# Patient Record
Sex: FEMALE | Employment: UNEMPLOYED | URBAN - METROPOLITAN AREA
[De-identification: names, ages, dates, MRNs, and addresses within clinical notes are randomized per-mention and may not be internally consistent; named-entity substitution may affect disease eponyms.]

---

## 2021-06-10 ENCOUNTER — HOSPITAL ENCOUNTER (OUTPATIENT)
Age: 36
Setting detail: SPECIMEN
Discharge: HOME | End: 2021-06-10
Payer: COMMERCIAL

## 2021-06-10 ENCOUNTER — OFFICE VISIT (OUTPATIENT)
Dept: PODIATRY | Age: 36
End: 2021-06-10
Payer: COMMERCIAL

## 2021-06-10 VITALS — HEIGHT: 64 IN | WEIGHT: 204 LBS | BODY MASS INDEX: 34.83 KG/M2

## 2021-06-10 DIAGNOSIS — B35.1 DERMATOPHYTOSIS OF NAIL: Primary | ICD-10-CM

## 2021-06-10 PROCEDURE — 11755 BIOPSY NAIL UNIT: CPT | Performed by: PODIATRIST

## 2021-06-10 PROCEDURE — 99203 OFFICE O/P NEW LOW 30 MIN: CPT | Performed by: PODIATRIST

## 2021-06-10 RX ORDER — PANTOPRAZOLE SODIUM 40 MG/1
40 TABLET, DELAYED RELEASE ORAL DAILY
COMMUNITY
Start: 2020-11-17

## 2021-06-10 RX ORDER — ACETAMINOPHEN 500 MG
500 TABLET ORAL EVERY 6 HOURS PRN
COMMUNITY

## 2021-06-10 RX ORDER — IBUPROFEN 200 MG
200 TABLET ORAL PRN
COMMUNITY

## 2021-06-10 RX ORDER — LORATADINE 10 MG/1
20 TABLET ORAL DAILY
COMMUNITY

## 2021-06-10 RX ORDER — DIPHENHYDRAMINE HCL 25 MG
25 CAPSULE ORAL NIGHTLY
COMMUNITY

## 2021-06-10 NOTE — PROGRESS NOTES
30 [facility] [id number] [address]  Dept: [phone]    NEW PATIENT PROGRESS NOTE  Date of patient's visit: 6/10/2021  Patient's Name:  Thad Hickey YOB: 1985            Patient Care Team:  Sheyla Santo DO as PCP - General (Family Medicine)        Chief Complaint   Patient presents with    New Patient    Nail Problem         HPI:   Thad Hickey is a 29 y.o. female who presents to the office today complaining of possible toenail fungus. Symptoms began 6 month(s) ago. Patient relates pain is Absent . Pain is rated 0 out of 10 and is described as none. Treatments prior to today's visit include: none. Currently denies F/C/N/V. Pt's primary care physician is Sheyla Santo DO last seen patient is new to the provider and has an upcoming appointment as a new patient. No Known Allergies    Past Medical History:   Diagnosis Date    Asthma 1998    Migraines     W/ MENSES       Prior to Admission medications    Medication Sig Start Date End Date Taking? Authorizing Provider   cyclobenzaprine (FLEXERIL) 5 MG tablet Take 5 mg by mouth 3 times daily as needed. Historical Provider, MD   Norgestim-Eth Estrad Triphasic (TRINESSA, 28,) 0.18/0.215/0.25 MG-35 MCG TABS Take 1 tablet by mouth daily. Dispense as written 11/8/12   LUIS FERNANDO Moreno CNM       Past Surgical History:   Procedure Laterality Date    MANDIBLE FRACTURE SURGERY  2002    TONSILLECTOMY  1998       No family history on file. Social History     Tobacco Use    Smoking status: Never Smoker    Smokeless tobacco: Never Used   Substance Use Topics    Alcohol use: No       Review of Systems    Review of Systems:   History obtained from chart review and the patient  General ROS: negative for - chills, fatigue, fever, night sweats or weight gain  Constitutional: Negative for chills, diaphoresis, fatigue, fever and unexpected weight change. Musculoskeletal: Positive for arthralgias, gait problem and joint swelling. Neurological ROS: negative for - behavioral changes, confusion, headaches or seizures. Negative for weakness and numbness. Dermatological ROS: negative for - mole changes, rash  Cardiovascular: Negative for leg swelling. Gastrointestinal: Negative for constipation, diarrhea, nausea and vomiting. Lower Extremity Physical Examination:   Vitals: There were no vitals filed for this visit. General: AAO x 3 in NAD. Dermatologic Exam:  Left hallux nail is thickened dystrophic with slight discoloration. Musculoskeletal:     1st MPJ ROM decreased, Bilateral.  Muscle strength 5/5, Bilateral.  Medial longitudinal arch, Bilateral WNL. Ankle ROM WNL,Bilateral.    Dorsally contracted digits absent digits 1-5 Bilateral.     Vascular: DP and PT pulses palpable 2/4, Bilateral.  CFT <3 seconds, Bilateral.  Hair growth present to the level of the digits, Bilateral.  Edema absent, Bilateral.  Varicosities absent, Bilateral. Erythema absent, Bilateral    Neurological: Sensation intact to light touch to level of digits, Bilateral.  Protective sensation intact 10/10 sites via 5.07/10g [place]-Bong Monofilament, Bilateral.  negative Tinel's, Bilateral.  negative Valleix sign, Bilateral.      Integument: Warm, dry, supple, Bilateral.  Open lesion absent, Bilateral.  Interdigital maceration absent to web spaces 1-4, Bilateral.   Fissures absent, Bilateral.     Asessment: Patient is a 29 y.o. female with:    Diagnosis Orders   1. Dermatophytosis of nail  Hepatic Function Panel    HI BIOPSY, NAIL UNIT (SEP PROC)         Plan: Patient examined and evaluated. Current condition and treatment options discussed in detail. Discussed conservative and surgical options with the patient. Advised pt to get LFT. Nail biopsy obtained of left hallux nail and sent to pathology.  Discussed topical vs oral antifungal. She would like to proceed with oral medication. Pending biopsy results, will call in medication to pharmacy. All labs were reviewed and all imagining including the above findings were reviewed PRIOR to the patients arrival and with the patient today. Previous patient encounter was reviewed. Encounters from the patients other medical providers were reviewed and noted. Time was spent educating the patient and their families/caregivers on proper care of the feet and ankles. All the above diagnosis were addressed at todays visit and all questions were answered. A total of 30 minutes was spent with this patients encounter which included charting after the patients visit    . Verbal and written instructions given to patient. Contact office with any questions/problems/concerns. RTC in 9week(s).     6/10/2021    Electronically signed by Antonia Torres DPM on 6/10/2021 at 9:14 AM  6/10/2021

## 2021-06-14 LAB — DERMATOLOGY PATHOLOGY REPORT: NORMAL

## 2023-08-01 ENCOUNTER — OFFICE VISIT (OUTPATIENT)
Dept: PODIATRY | Age: 38
End: 2023-08-01
Payer: COMMERCIAL

## 2023-08-01 VITALS — HEIGHT: 65 IN | WEIGHT: 240 LBS | BODY MASS INDEX: 39.99 KG/M2

## 2023-08-01 DIAGNOSIS — S92.345A CLOSED NONDISPLACED FRACTURE OF FOURTH METATARSAL BONE OF LEFT FOOT, INITIAL ENCOUNTER: ICD-10-CM

## 2023-08-01 DIAGNOSIS — M79.605 PAIN OF LEFT LOWER EXTREMITY: ICD-10-CM

## 2023-08-01 DIAGNOSIS — S92.335A CLOSED NONDISPLACED FRACTURE OF THIRD METATARSAL BONE OF LEFT FOOT, INITIAL ENCOUNTER: Primary | ICD-10-CM

## 2023-08-01 DIAGNOSIS — R60.0 EDEMA OF LOWER EXTREMITY: ICD-10-CM

## 2023-08-01 PROCEDURE — L4361 PNEUMA/VAC WALK BOOT PRE OTS: HCPCS | Performed by: PODIATRIST

## 2023-08-01 PROCEDURE — 99214 OFFICE O/P EST MOD 30 MIN: CPT | Performed by: PODIATRIST

## 2023-08-01 PROCEDURE — 28470 CLTX METATARSAL FX WO MNP EA: CPT | Performed by: PODIATRIST

## 2023-08-01 RX ORDER — IBUPROFEN 600 MG/1
600 TABLET ORAL 3 TIMES DAILY PRN
Qty: 30 TABLET | Refills: 0 | Status: SHIPPED | OUTPATIENT
Start: 2023-08-01

## 2023-08-01 RX ORDER — HYDROCODONE BITARTRATE AND ACETAMINOPHEN 5; 325 MG/1; MG/1
1 TABLET ORAL EVERY 6 HOURS PRN
Qty: 28 TABLET | Refills: 0 | Status: SHIPPED | OUTPATIENT
Start: 2023-08-01 | End: 2023-08-08

## 2023-08-01 RX ORDER — ESCITALOPRAM OXALATE 10 MG/1
10 TABLET ORAL DAILY
COMMUNITY

## 2023-08-01 NOTE — PROGRESS NOTES
[address]  Dept: [phone]    RETURN PATIENT PROGRESS NOTE  Date of patient's visit: 8/1/2023  Patient's Name:  Shereen Kelley YOB: 1985            Patient Care Team:  Josefina Hutchinson DO as PCP - General (Family Medicine)       Shereen Kelley 41 y.o. female that presents for follow-up of   Chief Complaint   Patient presents with    Foot Injury     Left foot x 2 days    Foot Pain     Left foot     Pt's primary care physician is Josefina Hutchinson DO last seen February 27 2023  Symptoms began 2 day(s) ago and are increased . Patient relates pain is Present. Pain is rated 8 out of 10 and is described as constant. Treatments prior to today's visit include: visit to emergency room, xrays, crutches, surgical shoe, norco for pain alternating with ibuprofen. Currently denies F/C/N/V. Patient states she slipped in a mud puddle. Allergies   Allergen Reactions    Seasonal      Other reaction(s): Other (See Comments)  Dust mites, and environmental; sneezing, watery eyes       Past Medical History:   Diagnosis Date    Asthma 1998    Migraines     W/ MENSES       Prior to Admission medications    Medication Sig Start Date End Date Taking? Authorizing Provider   escitalopram (LEXAPRO) 10 MG tablet Take 1 tablet by mouth daily   Yes Historical Provider, MD   ciclopirox (PENLAC) 8 % solution Apply topically nightly. Remove once weekly with alcohol or nail polish remover.  6/15/21  Yes Doy Sensing, DPM   pantoprazole (PROTONIX) 40 MG tablet Take 1 tablet by mouth daily 11/17/20  Yes Historical Provider, MD   loratadine (CLARITIN) 10 MG tablet Take 2 tablets by mouth daily   Yes Historical Provider, MD   ibuprofen (ADVIL;MOTRIN) 200 MG tablet Take 1 tablet by mouth as needed   Yes Historical Provider, MD   diphenhydrAMINE (BENADRYL) 25 MG capsule Take 1 capsule by mouth nightly

## 2023-08-22 ENCOUNTER — OFFICE VISIT (OUTPATIENT)
Dept: PODIATRY | Age: 38
End: 2023-08-22
Payer: COMMERCIAL

## 2023-08-22 VITALS — BODY MASS INDEX: 39.99 KG/M2 | WEIGHT: 240 LBS | HEIGHT: 65 IN

## 2023-08-22 DIAGNOSIS — S92.345A CLOSED NONDISPLACED FRACTURE OF FOURTH METATARSAL BONE OF LEFT FOOT, INITIAL ENCOUNTER: ICD-10-CM

## 2023-08-22 DIAGNOSIS — M79.605 PAIN OF LEFT LOWER EXTREMITY: ICD-10-CM

## 2023-08-22 DIAGNOSIS — R60.0 EDEMA OF LOWER EXTREMITY: ICD-10-CM

## 2023-08-22 DIAGNOSIS — S92.335A CLOSED NONDISPLACED FRACTURE OF THIRD METATARSAL BONE OF LEFT FOOT, INITIAL ENCOUNTER: Primary | ICD-10-CM

## 2023-08-22 PROCEDURE — 99213 OFFICE O/P EST LOW 20 MIN: CPT | Performed by: PODIATRIST

## 2023-08-22 RX ORDER — HYDROCODONE BITARTRATE AND ACETAMINOPHEN 5; 325 MG/1; MG/1
1 TABLET ORAL EVERY 6 HOURS PRN
Qty: 28 TABLET | Refills: 0 | Status: SHIPPED | OUTPATIENT
Start: 2023-08-22 | End: 2023-08-29

## 2023-08-22 NOTE — PROGRESS NOTES
[address]  Dept: [phone]    RETURN PATIENT PROGRESS NOTE  Date of patient's visit: 8/22/2023  Patient's Name:  Sonja Burrell YOB: 1985            Patient Care Team:  Mackenzie Chaudhry DO as PCP - General (Family Medicine)       Sonja Burrell 41 y.o. female that presents for follow-up of   Chief Complaint   Patient presents with    Foot Injury     Left foot injury    Foot Pain     Left foot     Pt's primary care physician is Kelly Hearn DO last seen February 27 2023  Symptoms began 3 week(s) ago and are decreased . Patient relates pain is Present. Pain is rated 3 out of 10 and is described as intermittent. Treatments prior to today's visit include: previous podiatry treatment, pain medication, cam boot. Currently denies F/C/N/V. Allergies   Allergen Reactions    Seasonal      Other reaction(s): Other (See Comments)  Dust mites, and environmental; sneezing, watery eyes       Past Medical History:   Diagnosis Date    Asthma 1998    Migraines     W/ MENSES       Prior to Admission medications    Medication Sig Start Date End Date Taking? Authorizing Provider   escitalopram (LEXAPRO) 10 MG tablet Take 1 tablet by mouth daily   Yes Historical Provider, MD Bailon[place]. Devices MISC Knee scooter  DX: right 3rd, 4th metatarsal fracture  Duration: 3 months 8/1/23  Yes Kaur Harrington DPM   ibuprofen (ADVIL;MOTRIN) 600 MG tablet Take 1 tablet by mouth 3 times daily as needed for Pain 8/1/23  Yes Roberta Rivera DPM   ciclopirox (PENLAC) 8 % solution Apply topically nightly. Remove once weekly with alcohol or nail polish remover.  6/15/21  Yes Kaur Harrington DPM   pantoprazole (PROTONIX) 40 MG tablet Take 1 tablet by mouth daily 11/17/20  Yes Historical Provider, MD   loratadine (CLARITIN) 10 MG tablet Take 2 tablets by mouth daily   Yes Historical Provider, MD   ibuprofen

## 2023-09-05 RX ORDER — ESCITALOPRAM OXALATE 10 MG/1
10 TABLET ORAL DAILY
Qty: 90 TABLET | Refills: 0 | Status: SHIPPED | OUTPATIENT
Start: 2023-09-05

## 2023-09-12 ENCOUNTER — OFFICE VISIT (OUTPATIENT)
Dept: PODIATRY | Age: 38
End: 2023-09-12
Payer: COMMERCIAL

## 2023-09-12 VITALS — BODY MASS INDEX: 39.99 KG/M2 | HEIGHT: 65 IN | WEIGHT: 240 LBS

## 2023-09-12 DIAGNOSIS — M79.605 PAIN OF LEFT LOWER EXTREMITY: Primary | ICD-10-CM

## 2023-09-12 PROCEDURE — 99213 OFFICE O/P EST LOW 20 MIN: CPT | Performed by: PODIATRIST

## 2023-09-20 ENCOUNTER — HOSPITAL ENCOUNTER (OUTPATIENT)
Dept: PHYSICAL THERAPY | Facility: CLINIC | Age: 38
Setting detail: THERAPIES SERIES
Discharge: HOME | End: 2023-09-20
Attending: PODIATRIST
Payer: COMMERCIAL

## 2023-09-20 PROCEDURE — 97110 THERAPEUTIC EXERCISES: CPT

## 2023-09-20 PROCEDURE — 97161 PT EVAL LOW COMPLEX 20 MIN: CPT

## 2023-09-21 NOTE — PROGRESS NOTES
bilaterally. No oral lesions. Neck: Supple, no tracheal deviation, no thyromegaly or nodules. Cardiovascular: S1-S2, RRR, no murmurs/rubs/gallops. Respiratory: Good air entry, clear to auscultation bilaterally, no wheezes or rhonchi. Abdomen: Soft, nontender, nondistended, no organomegaly, normal bowel sounds. MSK: Normal gait, range of motion WNL, strength 5/5 upper and lower extremities. Neuro: Alert and oriented x3, sensation intact, no gross focal deficits noted. Psychiatric: Normal mood and affect, normal thought content and judgment. ASSESSMENT/PLAN   1. Well woman exam without gynecological exam  -     Basic Metabolic Panel; Future  - Medications, laboratory testing, imaging, consultation, and follow up as documented in this encounter.   -  Cervical cancer screening per American Society for Colposcopy and Cervical Pathology guidelines. Patient due 2/2024  -  Birth control and barrier recommendations discussed as applicable. -  STI counseling and prevention reviewed. Does not want HIV testing or STI testing at this time  -  Hereditary Breast, Ovarian, Colon and Uterine Cancer screening with patients family history we offered this to her and she is currently considering this. - Pt getting flu shot and covid booster this fall  - Depression screening PHQ9: 2. Improved with counseling and is doing better with the difficulty she was having with her mother's passing. Plan to continue seeing Tami Roberts for therapy  2. Dyslipidemia  -     Lipid Panel; Future  3. BMI 40.0-44.9, adult (HCC)        - Continue the good work with the healthy diet and exercise   4. Nonsmoker  5. Anxiety       -  Patient anxiety improved CORNELIO 7 score 1       - Medication directions and side effects discussed as applicable. -  Side effects of lexapro gone over with patient. Considering having a third child and discussed possibility of weaning off.        Patient was seen and discussed with Dr. Kelsey Alvarado at the time

## 2023-09-21 NOTE — CONSULTS
[facility] Fall Risk Assessment    Patient Name:  Chino Byrne  : 1985    Risk Factor Scale  Score   History of Falls [x] Yes  [] No 25  0 25   Secondary Diagnosis [] Yes  [x] No 15  0 0   Ambulatory Aid [] Furniture  [x] Crutches/cane/walker  [] None/bedrest/wheelchair/nurse 30  15  0 15   IV/Heparin Lock [] Yes  [x] No 20  0 0   Gait/Transferring [] Impaired  [] Weak  [x] Normal/bedrest/immobile 20  10  0 0   Mental Status [] Forgets limitations  [x] Oriented to own ability 15  0 0      Total:40     Based on the Assessment score: check the appropriate box.     []  No intervention needed   Low =   Score of 0-24    [x]  Use standard prevention interventions Moderate =  Score of 24-44   [x] Give patient handout and discuss fall prevention strategies   [x] Establish goal of education for patient/family RE: fall prevention strategies    []  Use high risk prevention interventions High = Score of 45 and higher   [] Give patient handout and discuss fall prevention strategies   [] Establish goal of education for patient/family Re: fall prevention strategies   [] Discuss lifeline / other resources    Electronically signed by:   Ramírez Sanabria PT  Date: 2023
tilt - -   Ext 4 5  5      Ankle DF 9 from N N 3+ 5      PF 45 70 3+ 5      INV 6 37 3+ 5      EV 5 27 3- (pain lateral ankle) 5      Great toe extension 30 30          Decreased mobility intra metatarsal. Decreased joint mobility calcaneous all planes, decreased AP glide talus      Functional Test: LEFI Score: 58% functionally impaired     Comments:    Assessment:  Ms. Jenelle Jenkins, after metatarsal fractures, presents with impairments in ROM, strength, and function. Patient would benefit from skilled physical therapy services in order to: return her to her previous level of function including gait with no assistive device. Problem list, as detailed above:   [x] ? Pain     [x] ? ROM    [x] ? Strength    [x] ? Function:   [x] ? Balance  [x] Edema  [x] Gait Deviations       STG: (to be met in 18 treatments)  ? Pain: left ankle and foot to 2/10 at worst to allow patient to walk two blocks with a little to no difficulty  ? ROM: left ankle DF to N to allow for normal gait  ? Strength:left ankle to 4/5 in all planes to allow for push-off with gait and allow for ankle stability with gait  Patient to be independent with home exercise program as demonstrated by performance with correct form without cues. Demonstrate Knowledge of fall prevention  LTG: (to be met in 24 treatments)  LEFI 20% or less impaired  Gait on level and stairs with no device and minimal deviation                   Patient goals:walk without device and without pain    Rehab Potential:  [x] Good  [] Fair  [] Poor   Suggested Professional Referral:  [x] No  [] Yes:  Barriers to Goal Achievement:  [x] No  [] Yes:  Domestic Concerns:  [x] No  [] Yes:    Pt. Education:  [x] Plans/Goals, Risks/Benefits discussed  [x] Home exercise program    Method of Education: [x] Verbal  [x] Demo  [x] Written  9/21/23 ankle pumps, IV/EV, alphabet, gastroc stretch, weight shifts  Comprehension of Education:  [x] Verbalizes understanding. [x] Demonstrates understanding.   [x]

## 2023-09-22 ENCOUNTER — OFFICE VISIT (OUTPATIENT)
Age: 38
End: 2023-09-22
Payer: COMMERCIAL

## 2023-09-22 VITALS
DIASTOLIC BLOOD PRESSURE: 66 MMHG | HEART RATE: 82 BPM | HEIGHT: 65 IN | BODY MASS INDEX: 42.28 KG/M2 | RESPIRATION RATE: 16 BRPM | WEIGHT: 253.8 LBS | SYSTOLIC BLOOD PRESSURE: 107 MMHG | TEMPERATURE: 98.2 F

## 2023-09-22 DIAGNOSIS — E78.5 DYSLIPIDEMIA: ICD-10-CM

## 2023-09-22 DIAGNOSIS — Z78.9 NONSMOKER: ICD-10-CM

## 2023-09-22 DIAGNOSIS — Z00.00 WELL WOMAN EXAM WITHOUT GYNECOLOGICAL EXAM: Primary | ICD-10-CM

## 2023-09-22 DIAGNOSIS — F41.9 ANXIETY: ICD-10-CM

## 2023-09-22 PROCEDURE — 99395 PREV VISIT EST AGE 18-39: CPT | Performed by: FAMILY MEDICINE

## 2023-09-22 PROCEDURE — 96127 BRIEF EMOTIONAL/BEHAV ASSMT: CPT | Performed by: FAMILY MEDICINE

## 2023-09-22 PROCEDURE — 99211 OFF/OP EST MAY X REQ PHY/QHP: CPT

## 2023-09-22 RX ORDER — TRIAMCINOLONE ACETONIDE 1 MG/G
1 CREAM TOPICAL
COMMUNITY
Start: 2021-10-21

## 2023-09-22 RX ORDER — FAMOTIDINE 20 MG/1
1 TABLET, FILM COATED ORAL NIGHTLY PRN
COMMUNITY

## 2023-09-22 RX ORDER — FLUTICASONE PROPIONATE 50 MCG
1 SPRAY, SUSPENSION (ML) NASAL
COMMUNITY

## 2023-09-22 SDOH — ECONOMIC STABILITY: FOOD INSECURITY: WITHIN THE PAST 12 MONTHS, YOU WORRIED THAT YOUR FOOD WOULD RUN OUT BEFORE YOU GOT MONEY TO BUY MORE.: NEVER TRUE

## 2023-09-22 SDOH — HEALTH STABILITY: PHYSICAL HEALTH: ON AVERAGE, HOW MANY DAYS PER WEEK DO YOU ENGAGE IN MODERATE TO STRENUOUS EXERCISE (LIKE A BRISK WALK)?: 5 DAYS

## 2023-09-22 SDOH — HEALTH STABILITY: MENTAL HEALTH: HOW OFTEN DO YOU HAVE A DRINK CONTAINING ALCOHOL?: NEVER

## 2023-09-22 SDOH — SOCIAL STABILITY: SOCIAL NETWORK: ARE YOU MARRIED, WIDOWED, DIVORCED, SEPARATED, NEVER MARRIED, OR LIVING WITH A PARTNER?: MARRIED

## 2023-09-22 SDOH — SOCIAL STABILITY: SOCIAL INSECURITY: WITHIN THE LAST YEAR, HAVE YOU BEEN AFRAID OF YOUR PARTNER OR EX-PARTNER?: NO

## 2023-09-22 SDOH — ECONOMIC STABILITY: FOOD INSECURITY: WITHIN THE PAST 12 MONTHS, THE FOOD YOU BOUGHT JUST DIDN'T LAST AND YOU DIDN'T HAVE MONEY TO GET MORE.: NEVER TRUE

## 2023-09-22 SDOH — ECONOMIC STABILITY: HOUSING INSECURITY
IN THE LAST 12 MONTHS, WAS THERE A TIME WHEN YOU DID NOT HAVE A STEADY PLACE TO SLEEP OR SLEPT IN A SHELTER (INCLUDING NOW)?: NO

## 2023-09-22 SDOH — ECONOMIC STABILITY: HOUSING INSECURITY: IN THE LAST 12 MONTHS, HOW MANY PLACES HAVE YOU LIVED?: 1

## 2023-09-22 SDOH — SOCIAL STABILITY: SOCIAL NETWORK: HOW OFTEN DO YOU GET TOGETHER WITH FRIENDS OR RELATIVES?: TWICE A WEEK

## 2023-09-22 SDOH — HEALTH STABILITY: MENTAL HEALTH: HOW MANY STANDARD DRINKS CONTAINING ALCOHOL DO YOU HAVE ON A TYPICAL DAY?: PATIENT DOES NOT DRINK

## 2023-09-22 SDOH — SOCIAL STABILITY: SOCIAL INSECURITY: WITHIN THE LAST YEAR, HAVE YOU BEEN HUMILIATED OR EMOTIONALLY ABUSED IN OTHER WAYS BY YOUR PARTNER OR EX-PARTNER?: NO

## 2023-09-22 SDOH — ECONOMIC STABILITY: INCOME INSECURITY: HOW HARD IS IT FOR YOU TO PAY FOR THE VERY BASICS LIKE FOOD, HOUSING, MEDICAL CARE, AND HEATING?: NOT HARD AT ALL

## 2023-09-22 SDOH — SOCIAL STABILITY: SOCIAL NETWORK: HOW OFTEN DO YOU ATTENT MEETINGS OF THE CLUB OR ORGANIZATION YOU BELONG TO?: MORE THAN 4 TIMES PER YEAR

## 2023-09-22 SDOH — SOCIAL STABILITY: SOCIAL INSECURITY
WITHIN THE LAST YEAR, HAVE YOU BEEN KICKED, HIT, SLAPPED, OR OTHERWISE PHYSICALLY HURT BY YOUR PARTNER OR EX-PARTNER?: NO

## 2023-09-22 SDOH — HEALTH STABILITY: MENTAL HEALTH
STRESS IS WHEN SOMEONE FEELS TENSE, NERVOUS, ANXIOUS, OR CAN'T SLEEP AT NIGHT BECAUSE THEIR MIND IS TROUBLED. HOW STRESSED ARE YOU?: NOT AT ALL

## 2023-09-22 SDOH — SOCIAL STABILITY: SOCIAL NETWORK
DO YOU BELONG TO ANY CLUBS OR ORGANIZATIONS SUCH AS CHURCH GROUPS UNIONS, FRATERNAL OR ATHLETIC GROUPS, OR SCHOOL GROUPS?: YES

## 2023-09-22 SDOH — ECONOMIC STABILITY: INCOME INSECURITY: IN THE LAST 12 MONTHS, WAS THERE A TIME WHEN YOU WERE NOT ABLE TO PAY THE MORTGAGE OR RENT ON TIME?: NO

## 2023-09-22 SDOH — HEALTH STABILITY: PHYSICAL HEALTH: ON AVERAGE, HOW MANY MINUTES DO YOU ENGAGE IN EXERCISE AT THIS LEVEL?: 40 MIN

## 2023-09-22 SDOH — ECONOMIC STABILITY: TRANSPORTATION INSECURITY
IN THE PAST 12 MONTHS, HAS LACK OF TRANSPORTATION KEPT YOU FROM MEETINGS, WORK, OR FROM GETTING THINGS NEEDED FOR DAILY LIVING?: NO

## 2023-09-22 SDOH — SOCIAL STABILITY: SOCIAL NETWORK: IN A TYPICAL WEEK, HOW MANY TIMES DO YOU TALK ON THE PHONE WITH FAMILY, FRIENDS, OR NEIGHBORS?: THREE TIMES A WEEK

## 2023-09-22 SDOH — SOCIAL STABILITY: SOCIAL INSECURITY
WITHIN THE LAST YEAR, HAVE TO BEEN RAPED OR FORCED TO HAVE ANY KIND OF SEXUAL ACTIVITY BY YOUR PARTNER OR EX-PARTNER?: NO

## 2023-09-22 SDOH — SOCIAL STABILITY: SOCIAL NETWORK: HOW OFTEN DO YOU ATTEND CHURCH OR RELIGIOUS SERVICES?: MORE THAN 4 TIMES PER YEAR

## 2023-09-22 ASSESSMENT — ANXIETY QUESTIONNAIRES
5. BEING SO RESTLESS THAT IT IS HARD TO SIT STILL: 0
4. TROUBLE RELAXING: 1
6. BECOMING EASILY ANNOYED OR IRRITABLE: 0
IF YOU CHECKED OFF ANY PROBLEMS ON THIS QUESTIONNAIRE, HOW DIFFICULT HAVE THESE PROBLEMS MADE IT FOR YOU TO DO YOUR WORK, TAKE CARE OF THINGS AT HOME, OR GET ALONG WITH OTHER PEOPLE: SOMEWHAT DIFFICULT
1. FEELING NERVOUS, ANXIOUS, OR ON EDGE: 0
GAD7 TOTAL SCORE: 1
2. NOT BEING ABLE TO STOP OR CONTROL WORRYING: 0
7. FEELING AFRAID AS IF SOMETHING AWFUL MIGHT HAPPEN: 0
3. WORRYING TOO MUCH ABOUT DIFFERENT THINGS: 0

## 2023-09-22 ASSESSMENT — PATIENT HEALTH QUESTIONNAIRE - PHQ9
SUM OF ALL RESPONSES TO PHQ QUESTIONS 1-9: 5
9. THOUGHTS THAT YOU WOULD BE BETTER OFF DEAD, OR OF HURTING YOURSELF: 0
6. FEELING BAD ABOUT YOURSELF - OR THAT YOU ARE A FAILURE OR HAVE LET YOURSELF OR YOUR FAMILY DOWN: 0
SUM OF ALL RESPONSES TO PHQ QUESTIONS 1-9: 5
SUM OF ALL RESPONSES TO PHQ QUESTIONS 1-9: 5
8. MOVING OR SPEAKING SO SLOWLY THAT OTHER PEOPLE COULD HAVE NOTICED. OR THE OPPOSITE, BEING SO FIGETY OR RESTLESS THAT YOU HAVE BEEN MOVING AROUND A LOT MORE THAN USUAL: 0
3. TROUBLE FALLING OR STAYING ASLEEP: 0
5. POOR APPETITE OR OVEREATING: 1
SUM OF ALL RESPONSES TO PHQ QUESTIONS 1-9: 5
7. TROUBLE CONCENTRATING ON THINGS, SUCH AS READING THE NEWSPAPER OR WATCHING TELEVISION: 0
10. IF YOU CHECKED OFF ANY PROBLEMS, HOW DIFFICULT HAVE THESE PROBLEMS MADE IT FOR YOU TO DO YOUR WORK, TAKE CARE OF THINGS AT HOME, OR GET ALONG WITH OTHER PEOPLE: 1
1. LITTLE INTEREST OR PLEASURE IN DOING THINGS: 3
4. FEELING TIRED OR HAVING LITTLE ENERGY: 1

## 2023-09-22 NOTE — PATIENT INSTRUCTIONS
Thank you for following up with us at [facility] outpatient residency clinic! It was a pleasure to meet you today! Our plan is the following:  - We did a Physical exam today, come back in February when your pap smear is due   - We are going to follow up by rechecking a lipid panel and BMP which should be done fasting. I will fill out your work paperwork as soon as that is done  - With your family history we have a low threshold for starting mammogram as well as referring for genetic testing. Give us a call if this is something that you are interested in.   - We double checked Lexapro in pregnancy and found it to be low risk, but if you feel the need to wean off of it give us a call and we can start that process for you.  -for your broken foot continue to see podiatry and physical therapy. If you have any additional questions or concerns, please call the office ([phone]) and speak to one of the staff. They will triage and forward the message to the doctors! Have a great rest of your day!

## 2023-09-25 NOTE — PROGRESS NOTES
arch, Bilateral WNL. Ankle ROM WNL,Bilateral.    Dorsally contracted digits absent digits 1-5 Bilateral.      Vascular: DP and PT pulses palpable 2/4, Bilateral.  CFT <3 seconds, Bilateral.  Hair growth present to the level of the digits, Bilateral.  Edema noted to left dorsal midfoot. Varicosities absent, Bilateral. Erythema absent, Bilateral     Neurological: Sensation intact to light touch to level of digits, Bilateral.  Protective sensation intact 10/10 sites via 5.07/10g [place]-Bong Monofilament, Bilateral.  negative Tinel's, Bilateral.  negative Valleix sign, Bilateral.       Integument: Warm, dry, supple, Bilateral.  Open lesion absent, Bilateral.  Interdigital maceration absent to web spaces 1-4, Bilateral.  Nails are normal in length, thickness and color 1-5 bilateral.  Fissures absent, Bilateral.     Xray, 3 views, left foot: nondisplaced fractures of 3rd and 4th metatarsal base with signs of bone healing. Good anatomical alignment. Asessment: Patient is a 41 y.o. female with:    Diagnosis Orders   1. Pain of left lower extremity  XR FOOT LEFT (MIN 3 VIEWS)    Ambulatory referral to Physical Therapy            Plan: Patient examined and evaluated. Current condition and treatment options discussed in detail. Advised pt to remain partial WB with CAM boot at all times when walking. All labs were reviewed and all imagining including the above findings were reviewed PRIOR to the patients arrival and with the patient today. Previous patient encounter was reviewed. Encounters from the patients other medical providers were reviewed and noted. I personally interpreted the imaging and labs and discussed the results with the patient Time was spent educating the patient and their families/caregivers on proper care of the feet and ankles. All the above diagnosis were addressed at todays visit and all questions were answered.   A total of 20 minutes was spent with this patients encounter which

## 2023-09-27 ENCOUNTER — HOSPITAL ENCOUNTER (OUTPATIENT)
Dept: PHYSICAL THERAPY | Facility: CLINIC | Age: 38
Setting detail: THERAPIES SERIES
Discharge: HOME | End: 2023-09-27
Attending: PODIATRIST
Payer: COMMERCIAL

## 2023-09-27 PROCEDURE — 97110 THERAPEUTIC EXERCISES: CPT

## 2023-09-27 PROCEDURE — 97016 VASOPNEUMATIC DEVICE THERAPY: CPT

## 2023-09-27 NOTE — FLOWSHEET NOTE
x15min, min compression, 34 degrees  Precautions: fall risk  Exercises:  Exercise Reps/ Time Weight/ Level Comments   Scifit  6'     Gait training with single crutch  1 lap     Standing weight shifts 10x5\"           Seated      HR/TR 2x10 ea     Foot doming 2x10, 5\"     Toe Yoga 2x10     Towel crunches 2x10     Seated BAPS 2x10 L1 Cw/ccw   IV/EV with towel on floor null           Supine      Ankle pumps 2x10, 5\"       Ankle IV/EV 2x10, 5\"       Ankle circles x20ea  Cw/ccw   Long sit gastroc stretch 3x30\"       Alphabet 2 sets                           Other:      Specific Instructions for next treatment:  Focus on ROM, gait training with single crutch per tolerance, manual PRN, vaso PRN. Consider AlterG for gait training/ex at reduced WB     Treatment Charges: Mins Units   []  Modalities     [x]  Ther Exercise 35 2   []  Manual Therapy     []  Ther Activities     []  Neuro Re-ed     [x]  Vasocompression 15 1   [] Gait     []  Other     Total Treatment time 50 3       Assessment: [x] Progressing toward goals. Initiated session with Scifit for a warm up to promote blood flow prior to ex. Gait training completed with single crutch with fair tolerance. Needs cues for proper sequencing and to reduce stride length. She reports increased discomfort but notices she is able to ambulate with improved gait mechanics. Focused treatment on mobility and gentle strengthening of ankle and foot intrinsics. Cues provided to avoid pushing ex into pain. She reports soreness with foot intrinsic ex particularly. Ended treatment with vasocompression for soreness and swelling relief. Dicussed with pt to be mindful of gait mechanics with ambulation, and to avoid walking without any AD if too painful. Will monitor response to session and cont to progress per tolerance. [] No change.      [] Other:  [x] Patient would continue to benefit from skilled physical therapy services in order to: meet goals listed below     STG: (to be met in 18

## 2023-10-03 ENCOUNTER — HOSPITAL ENCOUNTER (OUTPATIENT)
Dept: PHYSICAL THERAPY | Facility: CLINIC | Age: 38
Setting detail: THERAPIES SERIES
Discharge: HOME | End: 2023-10-03
Attending: PODIATRIST
Payer: COMMERCIAL

## 2023-10-03 PROCEDURE — 97110 THERAPEUTIC EXERCISES: CPT

## 2023-10-03 PROCEDURE — 97016 VASOPNEUMATIC DEVICE THERAPY: CPT

## 2023-10-03 NOTE — FLOWSHEET NOTE
[] [address]  [address] [facility].  [phone]  F: [phone] [] [address] [facility]  [address]   [address]  P: [phone]  F: [phone] [x] [address]  [address] [facility]  P: [phone]  F: [phone] [] [place]: [phone]  F: [phone] [] [address] [facility]  [address] [facility]   [address]   P: [phone]  F: [phone]  [] [address] [facility].   P: [phone]  F: [phone] [] 205 [facility]  [address]  P: [phone]  F: [phone] [] [address] [facility]  [address] [facility]  [place]: [phone]  F: [phone] [] [address] [facility] [address]  [place]: [phone]  F: [phone]      Physical Therapy Daily Treatment Note    Date:  10/3/2023  Patient Name:  Juan Arechiga    :  1985  MRN: [id number]  Physician: Rosa Muro DO                               Insurance: [id number] [employer] 60 visits  Medical Diagnosis: Pain of left lower extremity                   Rehab Codes: M79.672  Onset date: 23               Next 's appt.: 23  Visit# / total visits: 3/24   Cancels/No Shows: 0    Subjective:    Pain:  [x] Yes  [] No Location: L ankle/foot Pain Rating: (0-10 scale) 2/10 (5/10 at worst)  Pain altered Tx:  [] No  [] Yes  Action:  Comments: Patient arrived using one crutch. She states she has been walking short distances in her home without any device. She continues to have a lot of swelling.  She has been

## 2023-10-11 ENCOUNTER — HOSPITAL ENCOUNTER (OUTPATIENT)
Dept: PHYSICAL THERAPY | Facility: CLINIC | Age: 38
Setting detail: THERAPIES SERIES
Discharge: HOME | End: 2023-10-11
Attending: PODIATRIST
Payer: COMMERCIAL

## 2023-10-11 PROCEDURE — 97110 THERAPEUTIC EXERCISES: CPT

## 2023-10-11 PROCEDURE — 97016 VASOPNEUMATIC DEVICE THERAPY: CPT

## 2023-10-11 NOTE — FLOWSHEET NOTE
[] [address]  [address] [facility].  [phone]  F: [phone] [] 204 [facility]  [address]   [address]  P: [phone]  F: [phone] [x] [address]  151 [facility]  P: [phone]  F: [phone] [] [facility]: [phone]  F: [phone] [] [address] [facility]  [address] [facility]   [address]   P: [phone]  F: [phone]  [] [address] [facility].   P: [phone]  F: [phone] [] [address] [facility]  [address]  P: [phone]  F: [phone] [] 224 [facility]  [address] [facility]  [place]: [phone]  F: [phone] [] [address] [facility] [address]  [place]: [phone]  F: [phone]      Physical Therapy Daily Treatment Note    Date:  10/11/2023  Patient Name:  Leta Lopez    :  1985  MRN: [id number]  Physician: Monica Clifford DO                               Insurance: Cornelia Botello 60 visits  Medical Diagnosis: Pain of left lower extremity                   Rehab Codes: M79.672  Onset date: 23               Next 's appt.: 23  Visit# / total visits:    Cancels/No Shows: 0    Subjective:    Pain:  [x] Yes  [] No Location: L ankle/foot Pain Rating: (0-10 scale) 4/10 (6/10 at worst)  Pain altered Tx:  [] No  [] Yes  Action:  Comments: Patient states she did a lot of walking over the weekend and this morning with some increased swelling and some discomfort on the dorsum of her foot.  She states she

## 2023-10-13 ENCOUNTER — HOSPITAL ENCOUNTER (OUTPATIENT)
Dept: PHYSICAL THERAPY | Facility: CLINIC | Age: 38
Setting detail: THERAPIES SERIES
Discharge: HOME | End: 2023-10-13
Attending: PODIATRIST
Payer: COMMERCIAL

## 2023-10-13 PROCEDURE — 97110 THERAPEUTIC EXERCISES: CPT

## 2023-10-13 PROCEDURE — 97016 VASOPNEUMATIC DEVICE THERAPY: CPT

## 2023-10-13 NOTE — FLOWSHEET NOTE
DF to N to allow for normal gait (met 10/3/23)  ? Strength:left ankle to 4/5 in all planes to allow for push-off with gait and allow for ankle stability with gait  Patient to be independent with home exercise program as demonstrated by performance with correct form without cues. Demonstrate Knowledge of fall prevention  LTG: (to be met in 24 treatments)  LEFI 20% or less impaired  Gait on level and stairs with no device and minimal deviation                    Patient goals:walk without device and without pain     Pt. Education:  [x] Yes  [] No  [x] Reviewed Prior HEP/Ed  Method of Education: [x] Verbal  [x] Demo  [] Written    10/11/23 standing gastroc stretch, T-band DF/PF/IV/EV  9/21/23 ankle pumps, IV/EV, alphabet, gastroc stretch, weight shifts    Comprehension of Education:  [x] Verbalizes understanding. [x] Demonstrates understanding. [x] Needs review. [x] Demonstrates/verbalizes HEP/Ed previously given. Plan: [x] Continue current frequency toward long and short term goals.           Time In: 1003       Time Out: 1107    Electronically signed by:  Claudean Ann, PT

## 2023-10-16 ENCOUNTER — HOSPITAL ENCOUNTER (OUTPATIENT)
Dept: PHYSICAL THERAPY | Facility: CLINIC | Age: 38
Setting detail: THERAPIES SERIES
Discharge: HOME | End: 2023-10-16
Attending: PODIATRIST
Payer: COMMERCIAL

## 2023-10-16 PROCEDURE — 97110 THERAPEUTIC EXERCISES: CPT

## 2023-10-16 PROCEDURE — 97016 VASOPNEUMATIC DEVICE THERAPY: CPT

## 2023-10-16 NOTE — FLOWSHEET NOTE
gait and allow for ankle stability with gait  Patient to be independent with home exercise program as demonstrated by performance with correct form without cues. Demonstrate Knowledge of fall prevention  LTG: (to be met in 24 treatments)  LEFI 20% or less impaired  Gait on level and stairs with no device and minimal deviation                    Patient goals:walk without device and without pain     Pt. Education:  [x] Yes  [] No  [x] Reviewed Prior HEP/Ed  Method of Education: [x] Verbal  [x] Demo  [] Written    10/11/23 standing gastroc stretch, T-band DF/PF/IV/EV  9/21/23 ankle pumps, IV/EV, alphabet, gastroc stretch, weight shifts    Comprehension of Education:  [x] Verbalizes understanding. [x] Demonstrates understanding. [x] Needs review. [x] Demonstrates/verbalizes HEP/Ed previously given. Plan: [x] Continue current frequency toward long and short term goals.           Time In: 1500       Time Out: 225 [facility]    Electronically signed by:  Chris Rasmussen PT

## 2023-10-19 ENCOUNTER — HOSPITAL ENCOUNTER (OUTPATIENT)
Dept: PHYSICAL THERAPY | Facility: CLINIC | Age: 38
Setting detail: THERAPIES SERIES
Discharge: HOME | End: 2023-10-19
Attending: PODIATRIST
Payer: COMMERCIAL

## 2023-10-19 PROCEDURE — 97110 THERAPEUTIC EXERCISES: CPT

## 2023-10-19 PROCEDURE — 97016 VASOPNEUMATIC DEVICE THERAPY: CPT

## 2023-10-19 NOTE — PROGRESS NOTES
[] [address]  4600 [facility].  [phone]  F: [phone] [] 204 [facility]  [address]  P: [phone]  F: [phone] [x] [address]  151 [facility]  P: [phone]  F: [phone] [] [place]: [phone]  F: [phone] [] 224 [facility]  One [facility]   [address]   P: [phone]  F: [phone]  [] 7180 [facility].   P: [phone]  F: [phone] [] 205 [facility]  [address]  P: [phone]  F: [phone] [] 224 [facility]  795 [facility]  [place]: [phone]  F: [phone] [] 1 Medical [place] [facility] [address]  [place]: [phone]  F: [phone]      Physical Therapy Daily Treatment Note/Progress Note    Date:  10/19/2023  Patient Name:  Kinga Harrison    :  1985  MRN: [id number]  Physician: Adalgisa Almanzar DO                               Insurance: [facility] 60 visits  Medical Diagnosis: Pain of left lower extremity                   Rehab Codes: M79.672  Onset date: 23               Next 's appt.: 23  Visit# / total visits:    Cancels/No Shows: 0    Subjective:    Pain:  [x] Yes  [] No Location: L ankle/foot Pain Rating: (0-10 scale) 2/10 (5/10 at worst)  Pain altered Tx:  [] No  [] Yes  Action:  Comments: Patient arrived without an assistive device. She states the compression socks help the swelling. Patient continues to c/o left great toe pain.   She

## 2023-10-20 ENCOUNTER — HOSPITAL ENCOUNTER (OUTPATIENT)
Age: 38
Discharge: HOME | End: 2023-10-20
Payer: COMMERCIAL

## 2023-10-20 DIAGNOSIS — Z00.00 WELL WOMAN EXAM WITHOUT GYNECOLOGICAL EXAM: ICD-10-CM

## 2023-10-20 DIAGNOSIS — E78.5 DYSLIPIDEMIA: ICD-10-CM

## 2023-10-20 LAB
ANION GAP SERPL CALCULATED.3IONS-SCNC: 10 MMOL/L (ref 9–17)
BUN SERPL-MCNC: 14 MG/DL (ref 6–20)
CALCIUM SERPL-MCNC: 9.4 MG/DL (ref 8.6–10.4)
CHLORIDE SERPL-SCNC: 102 MMOL/L (ref 98–107)
CHOLEST SERPL-MCNC: 208 MG/DL
CHOLESTEROL/HDL RATIO: 5.2
CO2 SERPL-SCNC: 26 MMOL/L (ref 20–31)
CREAT SERPL-MCNC: 0.6 MG/DL (ref 0.5–0.9)
GFR SERPL CREATININE-BSD FRML MDRD: >60 ML/MIN/1.73M2
GLUCOSE SERPL-MCNC: 97 MG/DL (ref 70–99)
HDLC SERPL-MCNC: 40 MG/DL
LDLC SERPL CALC-MCNC: 148 MG/DL (ref 0–130)
POTASSIUM SERPL-SCNC: 4.3 MMOL/L (ref 3.7–5.3)
SODIUM SERPL-SCNC: 138 MMOL/L (ref 135–144)
TRIGL SERPL-MCNC: 102 MG/DL

## 2023-10-20 PROCEDURE — 36415 COLL VENOUS BLD VENIPUNCTURE: CPT

## 2023-10-20 PROCEDURE — 80048 BASIC METABOLIC PNL TOTAL CA: CPT

## 2023-10-20 PROCEDURE — 80061 LIPID PANEL: CPT

## 2023-10-24 ENCOUNTER — HOSPITAL ENCOUNTER (OUTPATIENT)
Dept: PHYSICAL THERAPY | Facility: CLINIC | Age: 38
Setting detail: THERAPIES SERIES
Discharge: HOME | End: 2023-10-24
Attending: PODIATRIST
Payer: COMMERCIAL

## 2023-10-24 PROCEDURE — 97016 VASOPNEUMATIC DEVICE THERAPY: CPT

## 2023-10-24 PROCEDURE — 97110 THERAPEUTIC EXERCISES: CPT

## 2023-10-24 NOTE — FLOWSHEET NOTE
LEs.  Objective:   Improved gait- less antalgic and improved push off       Modalities: vasocompression x15min, min compression, 36 degrees  Precautions: fall risk  Exercises:  Exercise Reps/ Time Weight/ Level Comments   Scifit  6' L1    Standing      Total gym bilat heel raises 10x2 10x2 L13   Total gym squats 10x2  L13   Standing weight shifts 15x5\"  On foam   BAPS PWB 10x2 L2 PWB CW/CCW right foot on 4\" step   Step ups 10x2  4\"   Step overs 10  4\"   Gastroc stretch SB [email or web address]" L3    SLS [email or web address]"  With UE support- progress to no UE support   4-way kicks right 10  orange   Foot doming [email or web address]"     MOBO balance with 2\" step [email or web address]" each set     MOBO rocks 15 each     Seated            Towel crunches 10x2     IV/EV with towel on floor 10x2     Alphabet home                 Long sit      T-band PF 10x2 lime     T-band DF 10x2 lime    T-band EV 10x2 lime     T-band IV 10x2 lime                                        Other: Manual: PA talar glides to improve PF, talar rotations mobs, intertarsal mobs (not today)     Specific Instructions for next treatment:  Continue with ROM, progress strengthening; gait training, manual, and vaso PRN. Treatment Charges: Mins Units   []  Modalities     [x]  Ther Exercise 41 3   []  Manual Therapy     []  Ther Activities     []  Neuro Re-ed     [x]  Vasocompression 15 1   [] Gait     []  Other     Total Treatment time 56 4       Assessment: [x] Progressing toward goals. Added MOBO for balance and intrinsic strengthening. Added marble lifts for home for intrinsic strengthening. Tolerance to weight-bearing exercises improved with minimal fatigue. Tolerated treatment well. [] No change. [] Other:  [x] Patient would continue to benefit from skilled physical therapy services in order to: meet goals listed below     STG: (to be met in 18 treatments)  ?  Pain: left ankle and foot to 2/10 at worst to allow patient to walk two blocks with a little to no difficulty (5/10 on 10/19/23, compared to

## 2023-10-26 ENCOUNTER — HOSPITAL ENCOUNTER (OUTPATIENT)
Dept: PHYSICAL THERAPY | Facility: CLINIC | Age: 38
Setting detail: THERAPIES SERIES
Discharge: HOME | End: 2023-10-26
Attending: PODIATRIST
Payer: COMMERCIAL

## 2023-10-26 PROCEDURE — 97110 THERAPEUTIC EXERCISES: CPT

## 2023-10-26 PROCEDURE — 97016 VASOPNEUMATIC DEVICE THERAPY: CPT

## 2023-10-26 NOTE — FLOWSHEET NOTE
[] [address]  4600 [facility].  [phone]  F: [phone] [] 204 [facility]  [address]  P: [phone]  F: [phone] [x] [address]  151 [facility]  P: [phone]  F: [phone] [] [facility] [place]: [phone]  F: [phone] [] 224 [facility]  One [facility]   [address]   P: [phone]  F: [phone]  [] 5539 [facility].   P: [phone]  F: [phone] [] 205 [facility]  [address] [place]  [address]  P: [phone]  F: [phone] [] 224 [facility]  795 [facility]  [place]: [phone]  F: [phone] [] 4201 [facility] [address]  [place]: [phone]  F: [phone]      Physical Therapy Daily Treatment Note    Date:  10/26/2023  Patient Name:  Nathalie Doe    :  1985  MRN: [id number]  Physician: Ofe Auguste DO                               Insurance: [facility] 60 visits  Medical Diagnosis: Pain of left lower extremity                   Rehab Codes: M79.672  Onset date: 23               Next 's appt.: 23  Visit# / total visits:    Cancels/No Shows: 0    Subjective:    Pain:  [x] Yes  [] No Location: L ankle/foot Pain Rating: (0-10 scale) 2/10 (3/10 at worst)  Pain altered Tx:  [] No  [] Yes  Action:  Comments: pt reports she was a little more sore after previous visit.   Objective:   Improved gait- less antalgic and improved push off       Modalities: vasocompression x15min,

## 2023-10-31 ENCOUNTER — HOSPITAL ENCOUNTER (OUTPATIENT)
Dept: PHYSICAL THERAPY | Facility: CLINIC | Age: 38
Setting detail: THERAPIES SERIES
Discharge: HOME | End: 2023-10-31
Attending: PODIATRIST
Payer: COMMERCIAL

## 2023-10-31 PROCEDURE — 97016 VASOPNEUMATIC DEVICE THERAPY: CPT

## 2023-10-31 PROCEDURE — 97110 THERAPEUTIC EXERCISES: CPT

## 2023-10-31 NOTE — FLOWSHEET NOTE
treatments)  ? Pain: left ankle and foot to 2/10 at worst to allow patient to walk two blocks with a little to no difficulty (5/10 on 10/19/23, compared to 9/10 at eval)  ? ROM: left ankle DF to N to allow for normal gait (met 10/3/23)  ? Strength:left ankle to 4/5 in all planes to allow for push-off with gait and allow for ankle stability with gait (met for DF 10/19. Progressing in other planes)  Patient to be independent with home exercise program as demonstrated by performance with correct form without cues. Demonstrate Knowledge of fall prevention (met 10/19/23)  LTG: (to be met in 24 treatments)  LEFI 20% or less impaired (50% 10/19/23, compared to 58% at evaluation)  Gait on level and stairs with no device and minimal deviation                    Patient goals:walk without device and without pain     Pt. Education:  [x] Yes  [] No  [x] Reviewed Prior HEP/Ed  Method of Education: [x] Verbal  [x] Demo  [x] Written  10/24/23 marble pick ups  10/11/23 standing gastroc stretch, T-band DF/PF/IV/EV  9/21/23 ankle pumps, IV/EV, alphabet, gastroc stretch, weight shifts    Comprehension of Education:  [x] Verbalizes understanding. [x] Demonstrates understanding. [x] Needs review. [x] Demonstrates/verbalizes HEP/Ed previously given. Plan: [x] Continue current frequency toward long and short term goals.       Time In: 1:00 pm     Time Out: 2:00 pm    Electronically signed by:  Cathy Reyna PTA

## 2023-11-02 ENCOUNTER — HOSPITAL ENCOUNTER (OUTPATIENT)
Dept: PHYSICAL THERAPY | Facility: CLINIC | Age: 38
Setting detail: THERAPIES SERIES
Discharge: HOME | End: 2023-11-02
Attending: PODIATRIST
Payer: COMMERCIAL

## 2023-11-02 PROCEDURE — 97016 VASOPNEUMATIC DEVICE THERAPY: CPT

## 2023-11-02 PROCEDURE — 97110 THERAPEUTIC EXERCISES: CPT

## 2023-11-02 NOTE — FLOWSHEET NOTE
pain in the arch of her left foot, which began during the last visit. She wonders if it was the elliptical and states she has a history of problems with the Elliptical in the past.      Objective:   Gait with decreased heel strike and push-off       Modalities: vasocompression x15min, min compression, 36 degrees  Precautions: fall risk  Exercises:  Exercise Reps/ Time Weight/ Level Comments   Scifit 6'           Standing      SB stretch 3x30\"     Heel raises 2x10     Total gym squats 10x2 L16    Standing weight shifts 20x5\"  On foam   BAPS PWB 10x2 L2 PWB CW/CCW right foot on 4\" step   Step up/overs x10 6\"    SLS 3x15\"     4-way kicks right 2x10 lime    Foot doming next     MOBO balance with 2\" step next     MOBO rocks next     Mini Lunges null     LAX ball rolls 3'           Seated      Strap soleus stretch next     Towel crunches next     IV/EV with towel on floor next     Alphabet home                 Long sit      T-band PF home lime     T-band DF home lime    T-band EV home lime     T-band IV home lime                                        Other: Manual: PA talar glides to improve PF, talar rotations mobs, intertarsal mobs. IASTM plantar aspect left foot. Verbal cues for heel strike and push-off with gait. Specific Instructions for next treatment:  Continue with ROM, progress strengthening; gait training, manual, and vaso PRN. Treatment Charges: Mins Units   []  Modalities     [x]  Ther Exercise 41 3   [x]  Manual Therapy 10 -   []  Ther Activities     []  Neuro Re-ed     [x]  Vasocompression 15 1   [] Gait     []  Other     Total Treatment time 72 4       Assessment: [x] Progressing toward goals. Mild restrictions in PF persist.  Improved after joint mobilization. Fair tolerance to weight-bearing exercises due to plantar foot soreness and left hip pain that limited treatment. Tolerated soft tissue mobilization well. [] No change.      [] Other:  [x] Patient would continue to benefit from skilled

## 2023-11-06 ENCOUNTER — HOSPITAL ENCOUNTER (OUTPATIENT)
Dept: PHYSICAL THERAPY | Facility: CLINIC | Age: 38
Setting detail: THERAPIES SERIES
Discharge: HOME | End: 2023-11-06
Attending: PODIATRIST
Payer: COMMERCIAL

## 2023-11-06 PROCEDURE — 97140 MANUAL THERAPY 1/> REGIONS: CPT

## 2023-11-06 PROCEDURE — 97016 VASOPNEUMATIC DEVICE THERAPY: CPT

## 2023-11-06 PROCEDURE — 97110 THERAPEUTIC EXERCISES: CPT

## 2023-11-06 NOTE — FLOWSHEET NOTE
last treatment. Objective:  Modalities: vasocompression x15min, min compression, 36 degrees  Precautions: fall risk  Exercises:  Exercise Reps/ Time Weight/ Level Comments    Scifit 6'   x          Standing       SB stretch 3x30\"   x   Heel raises 3x10   x   Total gym squats 10x2 L16     Standing weight shifts 20x5\"  On foam    BAPS PWB 10x2 L2 PWB CW/CCW right foot on floor x   Step up/overs x10 6\"  x   SLS 3x15\"   x   4-way kicks L CKC 2x10 lime  x   Foot doming X20, 5\"   x   MOBO balance with 2\" step next      MOBO rocks next      Mini Lunges null      LAX ball rolls 3'             Seated       Towel crunches next      IV/EV with towel on floor next      Alphabet home                    Long sit       Strap gastroc, soleus stretch 3x30\" ea   x   Ankle AROM X20, 5\" ea  PF/DF, inv/ev x   T-band PF home lime      T-band DF home lime     T-band EV home lime      T-band IV home lime                                             Other: Manual: ankle joint mobs, STR peroneals/soleus  Specific Instructions for next treatment:  Continue with ROM, progress strengthening; gait training, manual, and vaso PRN. Treatment Charges: Mins Units   []  Modalities     [x]  Ther Exercise 28 2   [x]  Manual Therapy 15 1   []  Ther Activities     []  Neuro Re-ed     [x]  Vasocompression 15 1   [] Gait     []  Other     Total Treatment time 58 4       Assessment: [x] Progressing toward goals. Manual completed as noted above to improve ROM and reduce soft tissue restrictions. Pt reports improvement in mobility and greater ease with ambulation post manual. Fair tolerance to WB ex, some discomfort but tolerable. Ended with vaso for soreness relief. [] No change. [] Other:  [x] Patient would continue to benefit from skilled physical therapy services in order to: meet goals listed below     STG: (to be met in 18 treatments)  ?  Pain: left ankle and foot to 2/10 at worst to allow patient to walk two blocks with a little to no

## 2023-11-09 ENCOUNTER — HOSPITAL ENCOUNTER (OUTPATIENT)
Dept: PHYSICAL THERAPY | Facility: CLINIC | Age: 38
Setting detail: THERAPIES SERIES
Discharge: HOME | End: 2023-11-09
Attending: PODIATRIST
Payer: COMMERCIAL

## 2023-11-09 PROCEDURE — 97110 THERAPEUTIC EXERCISES: CPT

## 2023-11-09 PROCEDURE — 97140 MANUAL THERAPY 1/> REGIONS: CPT

## 2023-11-09 PROCEDURE — 97016 VASOPNEUMATIC DEVICE THERAPY: CPT

## 2023-11-09 NOTE — FLOWSHEET NOTE
[] [address]  4600 [facility].  [phone]  F: [phone] [] 204 [facility]  [address]   [address]  P: [phone]  F: [phone] [x] [address]  151 [facility]  P: [phone]  F: [phone] [] [place]: [phone]  F: [phone] [] 224 [facility]  One [facility]   [address]   P: [phone]  F: [phone]  [] 2[address] [facility].   P: [phone]  F: [phone] [] 205 [facility]  [address] [place]  [address]  P: [phone]  F: [phone] [] 224 [facility]  7[address] [facility]  [place]: [phone]  F: [phone] [] 1 Medical [place] [facility] [address]  [place]: [phone]  F: [phone]      Physical Therapy Daily Treatment Note    Date:  2023  Patient Name:  Nathalie Doe    :  1985  MRN: [id number]  Physician: Ofe Auguste DO                               Insurance: [facility] 60 visits  Medical Diagnosis: Pain of left lower extremity                   Rehab Codes: M79.672  Onset date: 23               Next 's appt.: 23  Visit# / total visits:  Progress note due visit 15  Cancels/No Shows: 0    Subjective:    Pain:  [x] Yes  [] No Location: L ankle/foot Pain Rating: (0-10 scale) 3/10  Pain altered Tx:  [] No  [] Yes  Action:  Comments: Patient c/o tightness in her Achilles bilaterally. Patient states she continues to improve. Her home ADL are approaching normal, \"expect the stairs. \"  She

## 2023-11-13 ENCOUNTER — HOSPITAL ENCOUNTER (OUTPATIENT)
Dept: PHYSICAL THERAPY | Facility: CLINIC | Age: 38
Setting detail: THERAPIES SERIES
Discharge: HOME | End: 2023-11-13
Attending: PODIATRIST
Payer: COMMERCIAL

## 2023-11-13 PROCEDURE — 97016 VASOPNEUMATIC DEVICE THERAPY: CPT

## 2023-11-13 PROCEDURE — 97140 MANUAL THERAPY 1/> REGIONS: CPT

## 2023-11-13 PROCEDURE — 97110 THERAPEUTIC EXERCISES: CPT

## 2023-11-13 NOTE — FLOWSHEET NOTE
[] [address]  4600 [facility].  [phone]  F: [phone] [] 204 [facility]  [address]   [address]  P: [phone]  F: [phone] [x] [address]  151 [facility]  P: [phone]  F: [phone] [] [place]: [phone]  F: [phone] [] 224 [facility]  One [facility]   [address]   P: [phone]  F: [phone]  [] 9990 [facility].   P: [phone]  F: [phone] [] 205 [facility]  [address] [place]  [address]  P: [phone]  F: [phone] [] 224 [facility]  795 [facility]  [place]: [phone]  F: [phone] [] 1 Medical [place] [facility] [address]  [place]: [phone]  F: [phone]      Physical Therapy Daily Treatment Note    Date:  2023  Patient Name:  Berenice Bhardwaj    :  1985  MRN: [id number]  Physician: Angelika Mohan DO                               Insurance: [facility] 60 visits  Medical Diagnosis: Pain of left lower extremity                   Rehab Codes: M79.672  Onset date: 23               Next 's appt.: 23  Visit# / total visits:  Progress note due visit 15  Cancels/No Shows: 0    Subjective:    Pain:  [x] Yes  [] No Location: L ankle/foot Pain Rating: (0-10 scale) 1-2/10  Pain altered Tx:  [] No  [] Yes  Action:  Comments: She reports minimal pain at rest, increases to 3/10 with ambulation.        Objective:  Gait with improved push-off    Treatment  Modalities: vasocompression

## 2023-11-16 ENCOUNTER — APPOINTMENT (OUTPATIENT)
Dept: PHYSICAL THERAPY | Facility: CLINIC | Age: 38
End: 2023-11-16
Attending: PODIATRIST
Payer: COMMERCIAL

## 2023-11-16 ENCOUNTER — OFFICE VISIT (OUTPATIENT)
Dept: PODIATRY | Age: 38
End: 2023-11-16
Payer: COMMERCIAL

## 2023-11-16 VITALS — WEIGHT: 253 LBS | HEIGHT: 65 IN | BODY MASS INDEX: 42.15 KG/M2

## 2023-11-16 DIAGNOSIS — S92.335A CLOSED NONDISPLACED FRACTURE OF THIRD METATARSAL BONE OF LEFT FOOT, INITIAL ENCOUNTER: Primary | ICD-10-CM

## 2023-11-16 DIAGNOSIS — S92.345A CLOSED NONDISPLACED FRACTURE OF FOURTH METATARSAL BONE OF LEFT FOOT, INITIAL ENCOUNTER: ICD-10-CM

## 2023-11-16 DIAGNOSIS — M79.605 PAIN OF LEFT LOWER EXTREMITY: ICD-10-CM

## 2023-11-16 PROCEDURE — 99213 OFFICE O/P EST LOW 20 MIN: CPT | Performed by: PODIATRIST

## 2023-11-17 ENCOUNTER — HOSPITAL ENCOUNTER (OUTPATIENT)
Dept: PHYSICAL THERAPY | Facility: CLINIC | Age: 38
Setting detail: THERAPIES SERIES
Discharge: HOME | End: 2023-11-17
Attending: PODIATRIST
Payer: COMMERCIAL

## 2023-11-17 PROCEDURE — 97016 VASOPNEUMATIC DEVICE THERAPY: CPT

## 2023-11-17 PROCEDURE — 97140 MANUAL THERAPY 1/> REGIONS: CPT

## 2023-11-17 PROCEDURE — 97110 THERAPEUTIC EXERCISES: CPT

## 2023-11-17 NOTE — FLOWSHEET NOTE
[] [address]  4600 [facility].  [phone]  F: [phone] [] 204 [facility]  [address]   [address]  P: [phone]  F: [phone] [x] [address]  151 [facility]  P: [phone]  F: [phone] [] [place]: [phone]  F: [phone] [] 224 [facility]  One [facility]   [address]   P: [phone]  F: [phone]  [] 7170 [facility].   P: [phone]  F: [phone] [] 205 [facility]  [address] [place]  [address]  P: [phone]  F: [phone] [] 224 [facility]  795 [facility]  [place]: [phone]  F: [phone] [] 1 Medical [place] [facility] [address]  [place]: [phone]  F: [phone]      Physical Therapy Daily Treatment Note    Date:  2023  Patient Name:  Andre Srivastava    :  1985  MRN: [id number]  Physician: Solange Porter DO                               Insurance: [employer] [facility] 60 visits  Medical Diagnosis: Pain of left lower extremity                   Rehab Codes: M79.672  Onset date: 23               Next 's appt.: 23  Visit# / total visits: 15/24 Progress note due visit 17  Cancels/No Shows: 0    Subjective:    Pain:  [x] Yes  [] No Location: L ankle/foot Pain Rating: (0-10 scale) 2/10 (4/10 at worst)  Pain altered Tx:  [] No  [] Yes  Action:  Comments: Patient saw podiatrist yesterday. She states her Achilles bilaterally are sore.   She states she has some dorsal foot pain, arch pain, and

## 2023-11-22 NOTE — PROGRESS NOTES
[address] [address] [facility]  Dept: [phone]    RETURN PATIENT PROGRESS NOTE  Date of patient's visit: 11/22/2023  Patient's Name:  Monique Reyes YOB: 1985            Patient Care Team:  Gloria Neely DO as PCP - General (Family Medicine)       Monique Reyes 41 y.o. female that presents for follow-up of   Chief Complaint   Patient presents with    Foot Pain     Left foot fx     Pt's primary care physician is Gary López DO last seen February 27 2023  Symptoms began 6 week(s) ago and are decreased . Patient relates pain is absent. Pain is rated 1 out of 10 and is described as intermittent. Treatments prior to today's visit include: previous podiatry treatment, pain medication, cam boot. Currently denies F/C/N/V. Allergies   Allergen Reactions    Seasonal      Other reaction(s): Other (See Comments)  Dust mites, and environmental; sneezing, watery eyes       Past Medical History:   Diagnosis Date    Asthma 01/01/1998    Closed fracture of third metatarsal bone of left foot 07/29/2023    third and fourth -- seeing Dr. Kelly Cagle ortho    Migraines     W/ MENSES       Prior to Admission medications    Medication Sig Start Date End Date Taking?  Authorizing Provider   aluminum chloride (DRYSOL) 20 % external solution 1 application at bedtime Externally bilateral underarms once daily for 30 days 5/16/22  Yes Gustavo Middleton MD   Lactobacillus-Inulin (CULTURELLE ADULT ULT BALANCE PO) Take 1 tablet by mouth daily   Yes Gustavo Middleton MD   Omega-3 Fatty Acids (FISH OIL) 600 MG CAPS Take 3 capsules by mouth daily   Yes Gustavo Middleton MD   Multiple Vitamins-Minerals (MULTIVITAMIN WOMEN PO) Take 1 tablet by mouth daily   Yes Gustavo Middleton MD   famotidine (PEPCID) 20 MG tablet Take 1 tablet by mouth nightly as needed   Yes Gustavo Middleton
to patient. Contact office with any questions/problems/concerns. No orders of the defined types were placed in this encounter. No orders of the defined types were placed in this encounter. RTC in {Numbers; 1-10:61129}{DAYS/WEEKS/MONTHS (D):49698}.     11/16/2023      Electronically signed by Connie Washington DPM on 11/16/2023 at 1:09 PM  11/16/2023

## 2023-11-27 RX ORDER — TRIAMCINOLONE ACETONIDE 1 MG/G
CREAM TOPICAL 2 TIMES DAILY
Qty: 28.4 G | Refills: 0 | Status: SHIPPED | OUTPATIENT
Start: 2023-11-27

## 2023-11-29 ENCOUNTER — HOSPITAL ENCOUNTER (OUTPATIENT)
Dept: PHYSICAL THERAPY | Facility: CLINIC | Age: 38
Setting detail: THERAPIES SERIES
Discharge: HOME | End: 2023-11-29
Attending: PODIATRIST
Payer: COMMERCIAL

## 2023-11-29 PROCEDURE — 97140 MANUAL THERAPY 1/> REGIONS: CPT

## 2023-11-29 PROCEDURE — 97110 THERAPEUTIC EXERCISES: CPT

## 2023-11-29 NOTE — FLOWSHEET NOTE
for ankle stability with gait (met for DF 10/19. Progressing in other planes)  Patient to be independent with home exercise program as demonstrated by performance with correct form without cues. Demonstrate Knowledge of fall prevention (met 10/19/23)  LTG: (to be met in 24 treatments)  LEFI 20% or less impaired (50% 10/19/23, compared to 58% at evaluation)  Gait on level and stairs with no device and minimal deviation                    Patient goals:walk without device and without pain     Pt. Education:  [x] Yes  [] No  [x] Reviewed Prior HEP/Ed  Method of Education: [x] Verbal  [x] Demo  [x] Written    Date: 11/17/2023  Exercises  - Single Leg Stance  - 1 x daily - 3 reps - 30 seconds hold  - Mini Lunge  - 2-3 x weekly - 1-2 sets - 10 reps  10/24/23 marble pick ups  10/11/23 standing gastroc stretch, T-band DF/PF/IV/EV  9/21/23 ankle pumps, IV/EV, alphabet, gastroc stretch, weight shifts    Comprehension of Education:  [x] Verbalizes understanding. [x] Demonstrates understanding. [x] Needs review. [x] Demonstrates/verbalizes HEP/Ed previously given. Plan: [x] Continue current frequency toward long and short term goals.       Time In: 1500 Time Out: 1550    Electronically signed by:  Kobe Dorsey PT

## 2023-12-05 ENCOUNTER — HOSPITAL ENCOUNTER (OUTPATIENT)
Dept: PHYSICAL THERAPY | Facility: CLINIC | Age: 38
Setting detail: THERAPIES SERIES
Discharge: HOME | End: 2023-12-05
Attending: PODIATRIST
Payer: COMMERCIAL

## 2023-12-05 PROCEDURE — 97016 VASOPNEUMATIC DEVICE THERAPY: CPT

## 2023-12-05 PROCEDURE — 97110 THERAPEUTIC EXERCISES: CPT

## 2023-12-05 NOTE — FLOWSHEET NOTE
[] [address]  [address] [facility].  [phone]  F: [phone] [] 204 [facility]  [address]   [address]  P: [phone]  F: [phone] [x] [address]  151 [facility]  P: [phone]  F: [phone] [] [place]: [phone]  F: [phone] [] 224 [facility]  One [facility]   [address]   P: [phone]  F: [phone]  [] 7170 [facility].   P: [phone]  F: [phone] [] 205 [facility]  [address] [place]  [address]  P: [phone]  F: [phone] [] 224 [facility]  [address] [facility]  [place]: [phone]  F: [phone] [] 1 Medical [place] [facility] [address]  [place]: [phone]  F: [phone]      Physical Therapy Daily Treatment Note    Date:  2023  Patient Name:  Prince Guerrero    :  1985  MRN: [id number]  Physician: Shawanda Patino DO                               Insurance: [employer] 60 visits  Medical Diagnosis: Pain of left lower extremity                   Rehab Codes: M79.672  Onset date: 23               Next 's appt.: 23  Visit# / total visits:  Progress note due visit 17  Cancels/No Shows: 0    Subjective:    Pain:  [x] Yes  [] No Location: L Achilles Pain Rating: (0-10 scale) 2/10 (4/10 at worst)  Pain altered Tx:  [] No  [] Yes  Action:  Comments: Pt reporting same level of pain as previous visit. Notes she is stiff this morning.      Objective:  Gait improved, but there is slight decreased

## 2023-12-12 ENCOUNTER — HOSPITAL ENCOUNTER (OUTPATIENT)
Dept: PHYSICAL THERAPY | Facility: CLINIC | Age: 38
Setting detail: THERAPIES SERIES
Discharge: HOME | End: 2023-12-12
Attending: PODIATRIST
Payer: COMMERCIAL

## 2023-12-12 PROCEDURE — 97110 THERAPEUTIC EXERCISES: CPT

## 2023-12-12 PROCEDURE — 97140 MANUAL THERAPY 1/> REGIONS: CPT

## 2023-12-12 RX ORDER — ESCITALOPRAM OXALATE 10 MG/1
10 TABLET ORAL DAILY
Qty: 90 TABLET | Refills: 1 | Status: SHIPPED | OUTPATIENT
Start: 2023-12-12

## 2023-12-12 NOTE — FLOWSHEET NOTE
[] [address]  4600 [facility].  [phone]  F: [phone] [] 204 [facility]  [address]   [address]  P: [phone]  F: [phone] [x] [address]  151 [facility]  P: [phone]  F: [phone] [] [place]: [phone]  F: [phone] [] 224 [facility]  One [facility]   [address]   P: [phone]  F: [phone]  [] [address] [facility]   P: [phone]  F: [phone] [] 205 [facility]  [address] [place]  [address]  P: [phone]  F: [phone] [] [facility]  [address] [place] [address]  [place]: [phone]  F: [phone] [] 1 Medical [place] [facility] [address]  [place]: [phone]  F: [phone]      Physical Therapy Daily Treatment Note    Date:  2023  Patient Name:  Monique Reyes    :  1985  MRN: [id number]  Physician: Gloria Neely DO                               Insurance: [employer] 60 visits  Medical Diagnosis: Pain of left lower extremity                   Rehab Codes: M79.672  Onset date: 23               Next 's appt.: 23  Visit# / total visits:  Progress note due visit 17  Cancels/No Shows: 0    Subjective:    Pain:  [x] Yes  [] No Location: left great toe Pain Rating: (0-10 scale) 2/10   Pain altered Tx:  [] No  [] Yes  Action:  Comments: Pt reports she was sore in her quads and gluts after her last visit. Patient states [place] with some soreness in the foot.   She

## 2024-01-17 ENCOUNTER — OFFICE VISIT (OUTPATIENT)
Dept: OBGYN CLINIC | Age: 39
End: 2024-01-17
Payer: COMMERCIAL

## 2024-01-17 ENCOUNTER — HOSPITAL ENCOUNTER (OUTPATIENT)
Age: 39
Setting detail: SPECIMEN
Discharge: HOME | End: 2024-01-17

## 2024-01-17 VITALS
BODY MASS INDEX: 41.32 KG/M2 | WEIGHT: 248 LBS | DIASTOLIC BLOOD PRESSURE: 82 MMHG | SYSTOLIC BLOOD PRESSURE: 130 MMHG | HEIGHT: 65 IN

## 2024-01-17 DIAGNOSIS — Z01.419 WOMEN'S ANNUAL ROUTINE GYNECOLOGICAL EXAMINATION: Primary | ICD-10-CM

## 2024-01-17 DIAGNOSIS — N39.3 STRESS INCONTINENCE: ICD-10-CM

## 2024-01-17 DIAGNOSIS — Z80.3 FAMILY HISTORY OF BREAST CANCER: ICD-10-CM

## 2024-01-17 DIAGNOSIS — Z12.4 CERVICAL CANCER SCREENING: ICD-10-CM

## 2024-01-17 DIAGNOSIS — Z12.31 BREAST CANCER SCREENING BY MAMMOGRAM: ICD-10-CM

## 2024-01-17 PROCEDURE — 99385 PREV VISIT NEW AGE 18-39: CPT | Performed by: ADVANCED PRACTICE MIDWIFE

## 2024-01-17 SDOH — ECONOMIC STABILITY: FOOD INSECURITY: WITHIN THE PAST 12 MONTHS, THE FOOD YOU BOUGHT JUST DIDN'T LAST AND YOU DIDN'T HAVE MONEY TO GET MORE.: NEVER TRUE

## 2024-01-17 SDOH — ECONOMIC STABILITY: FOOD INSECURITY: WITHIN THE PAST 12 MONTHS, YOU WORRIED THAT YOUR FOOD WOULD RUN OUT BEFORE YOU GOT MONEY TO BUY MORE.: NEVER TRUE

## 2024-01-17 SDOH — ECONOMIC STABILITY: TRANSPORTATION INSECURITY
IN THE PAST 12 MONTHS, HAS THE LACK OF TRANSPORTATION KEPT YOU FROM MEDICAL APPOINTMENTS OR FROM GETTING MEDICATIONS?: NO

## 2024-01-17 ASSESSMENT — PATIENT HEALTH QUESTIONNAIRE - PHQ9
SUM OF ALL RESPONSES TO PHQ QUESTIONS 1-9: 0
2. FEELING DOWN, DEPRESSED OR HOPELESS: 0
1. LITTLE INTEREST OR PLEASURE IN DOING THINGS: 0
SUM OF ALL RESPONSES TO PHQ9 QUESTIONS 1 & 2: 0
SUM OF ALL RESPONSES TO PHQ QUESTIONS 1-9: 0

## 2024-01-17 ASSESSMENT — ANXIETY QUESTIONNAIRES
7. FEELING AFRAID AS IF SOMETHING AWFUL MIGHT HAPPEN: 0
3. WORRYING TOO MUCH ABOUT DIFFERENT THINGS: 1
6. BECOMING EASILY ANNOYED OR IRRITABLE: 1
4. TROUBLE RELAXING: 0
GAD7 TOTAL SCORE: 2
1. FEELING NERVOUS, ANXIOUS, OR ON EDGE: 0
2. NOT BEING ABLE TO STOP OR CONTROL WORRYING: 0
5. BEING SO RESTLESS THAT IT IS HARD TO SIT STILL: 0

## 2024-01-17 ASSESSMENT — SOCIAL DETERMINANTS OF HEALTH (SDOH)
WITHIN THE LAST YEAR, HAVE TO BEEN RAPED OR FORCED TO HAVE ANY KIND OF SEXUAL ACTIVITY BY YOUR PARTNER OR EX-PARTNER?: NO
HOW HARD IS IT FOR YOU TO PAY FOR THE VERY BASICS LIKE FOOD, HOUSING, MEDICAL CARE, AND HEATING?: NOT HARD AT ALL
WITHIN THE LAST YEAR, HAVE YOU BEEN AFRAID OF YOUR PARTNER OR EX-PARTNER?: NO
WITHIN THE LAST YEAR, HAVE YOU BEEN HUMILIATED OR EMOTIONALLY ABUSED IN OTHER WAYS BY YOUR PARTNER OR EX-PARTNER?: NO
WITHIN THE LAST YEAR, HAVE YOU BEEN KICKED, HIT, SLAPPED, OR OTHERWISE PHYSICALLY HURT BY YOUR PARTNER OR EX-PARTNER?: NO

## 2024-01-17 ASSESSMENT — ENCOUNTER SYMPTOMS
RESPIRATORY NEGATIVE: 1
EYES NEGATIVE: 1
ALLERGIC/IMMUNOLOGIC NEGATIVE: 1
GASTROINTESTINAL NEGATIVE: 1

## 2024-01-17 NOTE — PROGRESS NOTES
Patient is here for her annual exam  Last pap was 12/13/16 wnl, patient states had one 3 years ago in [place]    Patient would like a refill of her    Chaperone for Intimate Exam  Chaperone was offered as part of the rooming process. Patient declined and agrees to continue with exam without a chaperone.  Chaperone: n/a       GAD7-2  PHQ2- 0  
screening    Infectious Diseases:  [] Gonorrhea & chlamydia screening  [] Hepatitis C Screening  [x] HIV risk assessment/ testing (at least once in lifetime): 2014 NR   [] Immunizations: follow with PCP   [] STI prevention counseling: monogamous     Cancer:  [x] Cervical cancer screening: done  [x] Mammograms (started at 41yrs old) see below  [x] BRCA testing risk assessment: see below   [] Colon cancer screening   [] Skin Cancer Screening     Cervical cancer screening  PAP Smear; Future    Breast cancer screening by mammogram  FREDDIE DERIC DIGITAL SCREEN BILATERAL; Future  Routine preventative screenings start at age 40, given family history of breast cancer recommend early mammogram. Pt plans for pregnancy and wants baseline completed before she potentially will breastfeed for 3+ years.     Stress incontinence  [facility] [place]    Family history of breast cancer  Pt's mother dx age 58 (passed away age 60). Reviewed since diagnosis was > age 49 gene mutation testing may not be covered by insurance especially since her mother was BRCA negative (per pt). Reviewed more gene mutation testing is available currently that she may be a candidate for but cost may be out of pocket. Given [employer] brochure for review. At this time await radiologist's calculation of Tyrer-Cuzick lifetime risk of breast cancer is and if > 20% may consider increased screening with MRI/mammogram alternating every 6 months or referral to high risk breast clinic as indicated.   FREDDIE DERIC DIGITAL SCREEN BILATERAL; Future  CBE normal today, reviewed SBE      Return in about 1 year (around 1/17/2025) for Annual exam.    Problem list reviewed and updated as indicated.   Upon completion of the visit all questions were answered.  History was reviewed as documented on Epic Navigator.    The patient, Kellen Vallejo, was seen with a total time spent of 35 minutes for the visit on this date of service by the [facility]  The time component

## 2024-01-19 LAB
HPV I/H RISK 4 DNA CVX QL NAA+PROBE: NOT DETECTED
HPV SAMPLE: NORMAL
HPV, INTERPRETATION: NORMAL
HPV16 DNA CVX QL NAA+PROBE: NOT DETECTED
HPV18 DNA CVX QL NAA+PROBE: NOT DETECTED
SPECIMEN DESCRIPTION: NORMAL

## 2024-01-22 ENCOUNTER — HOSPITAL ENCOUNTER (OUTPATIENT)
Dept: PHYSICAL THERAPY | Facility: CLINIC | Age: 39
Setting detail: THERAPIES SERIES
Discharge: HOME | End: 2024-01-22
Attending: PODIATRIST
Payer: COMMERCIAL

## 2024-01-22 PROCEDURE — 97110 THERAPEUTIC EXERCISES: CPT

## 2024-01-22 PROCEDURE — 97161 PT EVAL LOW COMPLEX 20 MIN: CPT

## 2024-01-22 PROCEDURE — 97530 THERAPEUTIC ACTIVITIES: CPT

## 2024-01-22 NOTE — CONSULTS
[x] [facility]  Outpatient Rehabilitation &  Therapy  [address]  P: [phone]  F: [phone] [] [facility]  Outpatient Rehabilitation &  Therapy  [address]   [address]  P: [phone]  F: [phone]     Physical Therapy Pelvic Floor Evaluation    Date:  2024  Patient: Kellen Vallejo  : 1985  MRN: [id number]  Physician: Karina Woods     Insurance: [employer] (42/60 visits per benefit period remain)  Medical Diagnosis: Stress Incontinence N93.3    Rehab Codes: N39.3, M62.50  Onset Date: 24                                  Next 's appt: PRN    Subjective:   CC: Pt is a 39 yo female who presents with complaints stress incontinence that has been ongoing since vaginal deliveries in  & 2018. Symptoms are worsening with stress movements and if she has an urge, she is not able to hold and will leak on the way to bathroom. Pt is still having normal menstrual cycles. No c/o pain.    HPI: (onset date:24 )    PMHx: [] Unremarkable [] Diabetes [] HTN  [] Pacemaker   [] MI/Heart Problems [] Cancer [] Arthritis [] Other:              [x] Refer to full medical chart  In EPIC     Comorbidities:   [] Obesity [] Dialysis  [x] N/A   [] Asthma/COPD [] Dementia [] Other:   [] Stroke [] Sleep apnea [] Other:   [] Vascular disease [] Rheumatic disease [] Other:     Tests:   [] X-Ray: [] MRI:  [] Other:    Medications: [x] Refer to full medical record [] None [] Other:  Allergies:       [x] Refer to full medical record [] None [] Other:    Function:  Hand Dominance  [x] Right  [] Left    Employer    Job Status []  Normal duty   [] Light duty   [] Off due to condition    []  Retired   [x] Not employed   [] Disability  [] Other:  []  Return to work:   Work activities/duties      Pain: [] Yes [x] No Location:  Pain Rating: (0-10 scale)0 /10  Pain altered Tx: [] Yes [x] No Action:      Symptoms: [] Improving [] Worsening [x] Same  Better:    [] AM

## 2024-01-30 ENCOUNTER — HOSPITAL ENCOUNTER (OUTPATIENT)
Dept: PHYSICAL THERAPY | Facility: CLINIC | Age: 39
Setting detail: THERAPIES SERIES
Discharge: HOME | End: 2024-01-30
Attending: PODIATRIST
Payer: COMMERCIAL

## 2024-01-30 PROCEDURE — 97530 THERAPEUTIC ACTIVITIES: CPT

## 2024-01-30 PROCEDURE — 97032 APPL MODALITY 1+ESTIM EA 15: CPT

## 2024-01-30 PROCEDURE — 90912 BFB TRAINING 1ST 15 MIN: CPT

## 2024-01-30 NOTE — CONSULTS
Other:      Treatment Charges: Mins Units   []  Modalities     []  Ther Exercise     []  Manual Therapy     []  Ther Activities     []  Neuro Re-ed     []  Vasocompression     [] Gait     []  Other     Total Billable time ***        Assessment: [] Progressing toward goals.    [] No change.     [] Other:  [] Patient would continue to benefit from skilled physical therapy services in order to: ***    STG/LTG    Pt. Education:  [] Yes  [] No  [] Reviewed Prior HEP/Ed  Method of Education: [] Verbal  [] Demo  [] Written  Comprehension of Education:  [] Verbalizes understanding.  [] Demonstrates understanding.  [] Needs review.  [] Demonstrates/verbalizes HEP/Ed previously given.     Plan: [] Continue current frequency toward long and short term goals.    [] Specific Instructions for subsequent treatments: ***      Time In:***            Time Out: ***    Electronically signed by:  Xiomara Castellon, PT

## 2024-02-02 ENCOUNTER — HOSPITAL ENCOUNTER (EMERGENCY)
Age: 39
Discharge: HOME | End: 2024-02-02
Attending: EMERGENCY MEDICINE
Payer: COMMERCIAL

## 2024-02-02 VITALS
HEIGHT: 64 IN | BODY MASS INDEX: 41.66 KG/M2 | SYSTOLIC BLOOD PRESSURE: 110 MMHG | RESPIRATION RATE: 26 BRPM | TEMPERATURE: 97.7 F | HEART RATE: 85 BPM | DIASTOLIC BLOOD PRESSURE: 93 MMHG | WEIGHT: 244 LBS

## 2024-02-02 DIAGNOSIS — T78.40XA ALLERGIC REACTION, INITIAL ENCOUNTER: Primary | ICD-10-CM

## 2024-02-02 PROCEDURE — 2580000003 HC RX 258

## 2024-02-02 PROCEDURE — 96374 THER/PROPH/DIAG INJ IV PUSH: CPT

## 2024-02-02 PROCEDURE — 2500000003 HC RX 250 WO HCPCS

## 2024-02-02 PROCEDURE — 6360000002 HC RX W HCPCS

## 2024-02-02 PROCEDURE — 99284 EMERGENCY DEPT VISIT MOD MDM: CPT

## 2024-02-02 PROCEDURE — A4216 STERILE WATER/SALINE, 10 ML: HCPCS

## 2024-02-02 PROCEDURE — 96372 THER/PROPH/DIAG INJ SC/IM: CPT

## 2024-02-02 RX ORDER — EPINEPHRINE 1 MG/ML
0.3 INJECTION, SOLUTION INTRAMUSCULAR; SUBCUTANEOUS ONCE
Status: COMPLETED | OUTPATIENT
Start: 2024-02-02 | End: 2024-02-02

## 2024-02-02 RX ORDER — PREDNISONE 20 MG/1
40 TABLET ORAL DAILY
Qty: 8 TABLET | Refills: 0 | Status: SHIPPED | OUTPATIENT
Start: 2024-02-02 | End: 2024-02-06

## 2024-02-02 RX ORDER — 0.9 % SODIUM CHLORIDE 0.9 %
1000 INTRAVENOUS SOLUTION INTRAVENOUS ONCE
Status: COMPLETED | OUTPATIENT
Start: 2024-02-02 | End: 2024-02-02

## 2024-02-02 RX ORDER — DIPHENHYDRAMINE HYDROCHLORIDE 50 MG/ML
25 INJECTION INTRAMUSCULAR; INTRAVENOUS ONCE
Status: COMPLETED | OUTPATIENT
Start: 2024-02-02 | End: 2024-02-02

## 2024-02-02 RX ORDER — EPINEPHRINE 0.3 MG/.3ML
0.3 INJECTION SUBCUTANEOUS ONCE
Qty: 1 EACH | Refills: 0 | Status: SHIPPED | OUTPATIENT
Start: 2024-02-02 | End: 2024-02-02

## 2024-02-02 RX ADMIN — SODIUM CHLORIDE 1000 ML: 9 INJECTION, SOLUTION INTRAVENOUS at 13:00

## 2024-02-02 RX ADMIN — DIPHENHYDRAMINE HYDROCHLORIDE 25 MG: 50 INJECTION INTRAMUSCULAR; INTRAVENOUS at 12:57

## 2024-02-02 RX ADMIN — EPINEPHRINE 0.3 MG: 1 INJECTION INTRAMUSCULAR; INTRAVENOUS; SUBCUTANEOUS at 12:56

## 2024-02-02 RX ADMIN — METHYLPREDNISOLONE SODIUM SUCCINATE 125 MG: 125 INJECTION, POWDER, FOR SOLUTION INTRAMUSCULAR; INTRAVENOUS at 12:57

## 2024-02-02 RX ADMIN — FAMOTIDINE 20 MG: 10 INJECTION, SOLUTION INTRAVENOUS at 12:57

## 2024-02-02 ASSESSMENT — ENCOUNTER SYMPTOMS
ROS SKIN COMMENTS: DIFFUSE ITCHING
DIARRHEA: 0
CHEST TIGHTNESS: 0
COUGH: 0
STRIDOR: 0
WHEEZING: 0
SORE THROAT: 1
TROUBLE SWALLOWING: 0
FACIAL SWELLING: 0
SINUS PRESSURE: 0
VOICE CHANGE: 0
SINUS PAIN: 0
CONSTIPATION: 0
ABDOMINAL PAIN: 0
VOMITING: 0
NAUSEA: 0
SHORTNESS OF BREATH: 0

## 2024-02-02 ASSESSMENT — PAIN SCALES - GENERAL: PAINLEVEL_OUTOF10: 8

## 2024-02-02 ASSESSMENT — PAIN - FUNCTIONAL ASSESSMENT: PAIN_FUNCTIONAL_ASSESSMENT: 0-10

## 2024-02-02 NOTE — ED PROVIDER NOTES
None    Allergies: Seasonal    Home Medications:   Prior to Admission medications    Medication Sig Start Date End Date Taking? Authorizing Provider   predniSONE (DELTASONE) 20 MG tablet Take 2 tablets by mouth daily for 4 days Take in the morning with food starting 2/3/24. 2/2/24 2/6/24 Yes Germaine Hancock APRN - CNP   EPINEPHrine (EPIPEN 2-VOLODYMYR) 0.3 MG/0.3ML SOAJ injection Inject 0.3 mLs into the muscle once for 1 dose Use as directed for allergic reaction 2/2/24 2/2/24 Yes Germaine Hancock APRN - CNP   escitalopram (LEXAPRO) 10 MG tablet TAKE 1 TABLET DAILY 12/12/23   Montana Avina DO   triamcinolone (KENALOG) 0.1 % cream Apply topically 2 times daily when rash is present for 7 days 11/27/23   Montana Avina DO   aluminum chloride (DRYSOL) 20 % external solution 1 application at bedtime Externally bilateral underarms once daily for 30 days  Patient not taking: Reported on 1/17/2024 5/16/22   Gustavo Middleton MD   Lactobacillus-Inulin (CULTURELLE ADULT ULT BALANCE PO) Take 1 tablet by mouth daily  Patient not taking: Reported on 1/17/2024    Gustavo Middleton MD   Omega-3 Fatty Acids (FISH OIL) 600 MG CAPS Take 3 capsules by mouth daily    Gustavo Middleton MD   Multiple Vitamins-Minerals (MULTIVITAMIN WOMEN PO) Take 1 tablet by mouth daily    Gustavo Middletno MD   famotidine (PEPCID) 20 MG tablet Take 1 tablet by mouth nightly as needed    Gustavo Middleton MD   fluticasone (FLONASE ALLERGY RELIEF) 50 MCG/ACT nasal spray 1 spray by Each Nostril route Every Day    Gustavo Middleton MD   Melatonin 3 MG CAPS 2 capsule at bedtime as needed Once a day    Gustavo Middleton MD Misc. Devices MISC Knee scooter  DX: right 3rd, 4th metatarsal fracture  Duration: 3 months  Patient not taking: Reported on 1/17/2024 8/1/23   Roberta Rivera DPM   loratadine (CLARITIN) 10 MG tablet Take 1 tablet by mouth daily    Gustavo Middleton MD   ibuprofen (ADVIL;MOTRIN) 200 MG  [id number]  [phone]  Go to   New or worsening symptoms      DISCHARGE MEDICATIONS:  New Prescriptions    EPINEPHRINE (EPIPEN 2-VOLODYMYR) 0.3 MG/0.3ML SOAJ INJECTION    Inject 0.3 mLs into the muscle once for 1 dose Use as directed for allergic reaction    PREDNISONE (DELTASONE) 20 MG TABLET    Take 2 tablets by mouth daily for 4 days Take in the morning with food starting 2/3/24.       LUIS FERNANDO Chong CNP   Emergency Medicine Nurse Practitioner    (Please note that portions of this note were completed with a voice recognition program.  Efforts were made to edit the dictations but occasionally words aremis-transcribed.)       Germaine Hancock APRN - CNP  02/02/24 [id number]

## 2024-02-02 NOTE — ED PROVIDER NOTES
[facility] Emergency Department    [address]  [place] [address]  Phone: [phone]  Fax: [phone]  Emergency Department  Faculty Attestation    I performed a history and physical examination of the patient and discussed management with the mid level provider. I reviewed the mid level provider's note and agree with the documented findings and plan of care. Any areas of disagreement are noted on the chart. I was personally present for the key portions of any procedures. I have documented in the chart those procedures where I was not present during the key portions. I have reviewed the emergency nurses triage note. I agree with the chief complaint, past medical history, past surgical history, allergies, medications, social and family history as documented unless otherwise noted below. Documentation of the HPI, Physical Exam and Medical Decision Making performed by medical students or scribes is based on my personal performance of the HPI, PE and MDM. For Physician Assistant/ Nurse Practitioner cases/documentation I have personally evaluated this patient and have completed at least one if not all key elements of the E/M (history, physical exam, and MDM). Additional findings are as noted.      Primary Care Physician:  Montana Avina, DO    CHIEF COMPLAINT       Chief Complaint   Patient presents with    Pruritis     Started around 1215, states she has had no new detergent, lotions, body wash, no new environmental changes       RECENT VITALS:   Temp: 97.7 °F (36.5 °C),  Pulse: 85, Respirations: 26, BP: (!) 110/93    LABS:  Labs Reviewed - No data to display      PERTINENT ATTENDING PHYSICIAN COMMENTS:    The patient presents with an allergic reaction.  She thinks it is due to eating guacamole.  She had some yesterday and thought it was okay, but today she started having hives.  She denies new medications, detergents, cosmetics, or soaps.  She reports some soreness in

## 2024-02-02 NOTE — DISCHARGE INSTRUCTIONS
Take your medication as prescribed.     Take prednisone in the morning for the next four days starting tomorrow. You were given a steroid in the ER today.    Take zyrtec daily.    Take pepcid twice daily for at least seven days.    Stop using any new medications, detergents, soaps, shampoos, hair dye or anything else that could have caused this allergic reaction.    PLEASE RETURN TO THE EMERGENCY DEPARTMENT IMMEDIATELY for worsening symptoms of the reaction, shortness of breath, wheezing, sensation of your throat is closing, or if you develop any concerning symptoms such as: high fever not relieved by acetaminophen (Tylenol) and/or ibuprofen (Motrin), chills, shortness of breath, chest pain, persistent nausea and/or vomiting, numbness, weakness or tingling in the arms or legs or change in color of the extremities, changes in mental status, persistent headache, blurry vision, unable to follow up with your physician, or other any other care or concern.

## 2024-02-05 LAB — CYTOLOGY REPORT: NORMAL

## 2024-02-06 PROBLEM — T78.1XXA ALLERGIC REACTION TO FOOD: Status: ACTIVE | Noted: 2024-02-06

## 2024-02-06 NOTE — PROGRESS NOTES
Refill: Capillary refill takes less than 2 seconds.      Findings: No rash.   Neurological:      General: No focal deficit present.      Mental Status: She is alert and oriented to person, place, and time.   Psychiatric:         Mood and Affect: Mood normal.         Behavior: Behavior normal.         Thought Content: Thought content normal.         Judgment: Judgment normal.         LABS       No results found for this visit on 02/07/24.     COMMUNICATION   Questions/concerns answered. Patient verbalized and expressed understanding. Medications, laboratory testing, imaging, consultation, and follow up as documented in this encounter.     Patient Instructions   Thank you for following up with us at [facility] outpatient residency clinic! It was a pleasure to meet you today!     Today we discussed the below as next steps in your care:  Follow-up with an Allergy specialist  Stopping Benadryl and the additional dose of Pepcid    If you have any additional questions or concerns, please call the office ([phone]) and speak to one of the staff. They will triage and forward the message to the doctors! Have a great rest of your day!        Please be aware portions of this note were completed using voice recognition software and unforeseen errors may have occurred    Patient was discussed with Lucy Gerard DO.  Electronically signed by Ina Strauss MD on 2/8/2024 at 9:21 AM

## 2024-02-06 NOTE — PATIENT INSTRUCTIONS
Thank you for following up with us at [facility] outpatient residency clinic! It was a pleasure to meet you today!     Today we discussed the below as next steps in your care:  Follow-up with an Allergy specialist  Stopping Benadryl and the additional dose of Pepcid    If you have any additional questions or concerns, please call the office ([phone]) and speak to one of the staff. They will triage and forward the message to the doctors! Have a great rest of your day!

## 2024-02-07 ENCOUNTER — OFFICE VISIT (OUTPATIENT)
Age: 39
End: 2024-02-07
Payer: COMMERCIAL

## 2024-02-07 VITALS
TEMPERATURE: 97.7 F | WEIGHT: 249 LBS | HEART RATE: 79 BPM | RESPIRATION RATE: 16 BRPM | SYSTOLIC BLOOD PRESSURE: 106 MMHG | DIASTOLIC BLOOD PRESSURE: 67 MMHG | BODY MASS INDEX: 41.48 KG/M2 | HEIGHT: 65 IN

## 2024-02-07 DIAGNOSIS — T78.1XXA ALLERGIC REACTION TO FOOD, INITIAL ENCOUNTER: Primary | ICD-10-CM

## 2024-02-07 PROCEDURE — 99213 OFFICE O/P EST LOW 20 MIN: CPT

## 2024-02-07 PROCEDURE — 99213 OFFICE O/P EST LOW 20 MIN: CPT | Performed by: FAMILY MEDICINE

## 2024-02-13 ENCOUNTER — APPOINTMENT (OUTPATIENT)
Dept: PHYSICAL THERAPY | Facility: CLINIC | Age: 39
End: 2024-02-13
Attending: PODIATRIST
Payer: COMMERCIAL

## 2024-02-15 ENCOUNTER — HOSPITAL ENCOUNTER (EMERGENCY)
Age: 39
Discharge: HOME | End: 2024-02-15
Attending: EMERGENCY MEDICINE
Payer: COMMERCIAL

## 2024-02-15 VITALS
HEIGHT: 64 IN | OXYGEN SATURATION: 94 % | TEMPERATURE: 97.9 F | RESPIRATION RATE: 20 BRPM | SYSTOLIC BLOOD PRESSURE: 129 MMHG | DIASTOLIC BLOOD PRESSURE: 84 MMHG | HEART RATE: 77 BPM | WEIGHT: 242.51 LBS | BODY MASS INDEX: 41.4 KG/M2

## 2024-02-15 DIAGNOSIS — T78.40XA ALLERGIC REACTION TO DRUG, INITIAL ENCOUNTER: Primary | ICD-10-CM

## 2024-02-15 DIAGNOSIS — T78.2XXA ANAPHYLAXIS, INITIAL ENCOUNTER: ICD-10-CM

## 2024-02-15 PROCEDURE — 6370000000 HC RX 637 (ALT 250 FOR IP): Performed by: EMERGENCY MEDICINE

## 2024-02-15 PROCEDURE — 99283 EMERGENCY DEPT VISIT LOW MDM: CPT

## 2024-02-15 PROCEDURE — 36415 COLL VENOUS BLD VENIPUNCTURE: CPT

## 2024-02-15 PROCEDURE — 83520 IMMUNOASSAY QUANT NOS NONAB: CPT

## 2024-02-15 RX ORDER — PREDNISONE 20 MG/1
50 TABLET ORAL ONCE
Status: COMPLETED | OUTPATIENT
Start: 2024-02-15 | End: 2024-02-15

## 2024-02-15 RX ORDER — PREDNISONE 20 MG/1
20 TABLET ORAL DAILY
Qty: 4 TABLET | Refills: 0 | Status: SHIPPED | OUTPATIENT
Start: 2024-02-15 | End: 2024-02-19

## 2024-02-15 RX ORDER — EPINEPHRINE 0.3 MG/.3ML
0.3 INJECTION SUBCUTANEOUS ONCE
Qty: 1 EACH | Refills: 0 | Status: SHIPPED | OUTPATIENT
Start: 2024-02-15 | End: 2024-02-15

## 2024-02-15 RX ADMIN — PREDNISONE 50 MG: 20 TABLET ORAL at 10:03

## 2024-02-15 ASSESSMENT — PAIN - FUNCTIONAL ASSESSMENT: PAIN_FUNCTIONAL_ASSESSMENT: NONE - DENIES PAIN

## 2024-02-15 NOTE — ED PROVIDER NOTES
[facility] Emergency Department  [address]  [place] [facility] [place] [address]  Phone: [phone]  Fax: [phone]        [facility] EMERGENCY DEPARTMENT  EMERGENCY DEPARTMENT ENCOUNTER      Pt Name: Kellen Vallejo  MRN: [id number]  Birthdate 1985  Date of evaluation: 2/15/2024  Provider: Julianna Schmitt MD    CHIEF COMPLAINT       Chief Complaint   Patient presents with    Pruritis     Diffuse itching, redness to anterior chest and neck area.  Sudden onset today, shortly after taking DayQuil allergy medication. Pt took own EpiPen prior to arrival to ER.         HISTORY OF PRESENT ILLNESS   (Location/Symptom, Timing/Onset,Context/Setting, Quality, Duration, Modifying Factors, Severity)  Note limiting factors.   Kellen Vallejo is a 38 y.o. female who presents to the emergency department after experiencing an allergic reaction to DayQuil.  She was seen in the emergency department about 2 to 3 weeks ago with similar symptoms.  At that point she did not make the connection that it was a DayQuil that caused it.  She was given steroids as well as an EpiPen.  Today after taking the DayQuil she began having redness and hives to her neck and chest area as well as some shortness of breath.  She used her EpiPen prior to arrival.  She currently feels better.  She does have an appointment set up with an allergist in April.    Nursing Notes were reviewed.    REVIEW OF SYSTEMS    (2-9systems for level 4, 10 or more for level 5)     Review of Systems   Respiratory:  Positive for shortness of breath.    Skin:  Positive for rash.       Except asnoted above the remainder of the review of systems was reviewed and negative.       PAST MEDICAL HISTORY     Past Medical History:   Diagnosis Date    Asthma 01/01/1998    Closed fracture of third metatarsal bone of left foot 07/29/2023    third and fourth -- seeing Dr. Roberta Rivera ortho    Migraines     W/ MENSES         SURGICAL

## 2024-02-16 ENCOUNTER — HOSPITAL ENCOUNTER (OUTPATIENT)
Dept: WOMENS IMAGING | Age: 39
Discharge: HOME | End: 2024-02-16
Payer: COMMERCIAL

## 2024-02-16 DIAGNOSIS — Z01.419 WOMEN'S ANNUAL ROUTINE GYNECOLOGICAL EXAMINATION: ICD-10-CM

## 2024-02-16 DIAGNOSIS — Z80.3 FAMILY HISTORY OF BREAST CANCER: ICD-10-CM

## 2024-02-16 DIAGNOSIS — Z12.31 BREAST CANCER SCREENING BY MAMMOGRAM: ICD-10-CM

## 2024-02-16 PROCEDURE — 77063 BREAST TOMOSYNTHESIS BI: CPT

## 2024-02-17 LAB — TRYPTASE SERPL-MCNC: 5.4 UG/L

## 2024-02-19 ENCOUNTER — TELEPHONE (OUTPATIENT)
Dept: OBGYN CLINIC | Age: 39
End: 2024-02-19

## 2024-02-19 NOTE — TELEPHONE ENCOUNTER
Attempted to call Kellen to review normal mammogram results and radiologist recommendation for increased screening based on family history (elevated Tyrer-Cuzick scoring), if pt returns call please direct to writer.

## 2024-02-22 PROBLEM — Z80.3 FAMILY HISTORY OF BREAST CANCER: Status: ACTIVE | Noted: 2024-02-22

## 2024-02-23 ENCOUNTER — HOSPITAL ENCOUNTER (OUTPATIENT)
Dept: PHYSICAL THERAPY | Facility: CLINIC | Age: 39
Setting detail: THERAPIES SERIES
Discharge: HOME | End: 2024-02-23
Attending: PODIATRIST
Payer: COMMERCIAL

## 2024-02-23 PROCEDURE — 90912 BFB TRAINING 1ST 15 MIN: CPT

## 2024-02-23 PROCEDURE — G0283 ELEC STIM OTHER THAN WOUND: HCPCS

## 2024-02-23 PROCEDURE — 97530 THERAPEUTIC ACTIVITIES: CPT

## 2024-02-23 NOTE — FLOWSHEET NOTE
self-correct valsalva maneuver during pennie graphic. Cont ES for increased motor unit recruitment. 5:10 cycle for 15 min. Pt contracted with ES for last 10 min of the cycle. Will cont to progress as guillermina.     [] No change.     [] Other:      STG: (to be met in 6 treatments)  Able to isolate PF musculature  Elicit PF muscle contraction long enough to inhibit bladder contraction with cough, sneeze. laugh  ? Strength:PF 3/5  ? Function: no reports of leaking with cough, sneeze and laugh  Able to perform basic ADL's without PF pressure or prolapse  Independent with Home Exercise Programs    LTG: (to be met in 12 treatments)  PF strength 4/5  Able to perform all advanced ADL's without leaking  Able to perform all advanced ADL's without PF pressure or prolapse  Pt will report a 5 point improvement on ИВАН to indicate improved urogenital functioning.     Patient goals: Improved pelvic floor function    Pt. Education:  [x] Yes  [] No  [x] Reviewed Prior HEP/Ed  Method of Education: [x] Verbal  [x] Demo  [] Written  Comprehension of Education:  [x] Verbalizes understanding.  [x] Demonstrates understanding.  [] Needs review.  [] Demonstrates/verbalizes HEP/Ed previously given.     Plan: [x] Continue with current plan of care towards goals.        Time In: 2:00 pm            Time Out: 3:00 pm    Electronically signed by:  Alyce Vinson PTA

## 2024-03-05 ENCOUNTER — HOSPITAL ENCOUNTER (OUTPATIENT)
Dept: PHYSICAL THERAPY | Facility: CLINIC | Age: 39
Setting detail: THERAPIES SERIES
Discharge: HOME | End: 2024-03-05
Attending: PODIATRIST
Payer: COMMERCIAL

## 2024-03-05 PROCEDURE — G0283 ELEC STIM OTHER THAN WOUND: HCPCS

## 2024-03-05 PROCEDURE — 97530 THERAPEUTIC ACTIVITIES: CPT

## 2024-03-05 PROCEDURE — 90912 BFB TRAINING 1ST 15 MIN: CPT

## 2024-03-05 NOTE — FLOWSHEET NOTE
pain laying on the mat. Added a pillow for further support with significant inc in mm contraction strength during pennie graphic. Cont ES for increased motor unit recruitment. 5:10 cycle for 15 min. Pt contracted with ES for last 10 min of the cycle. Will cont to progress as guillermina.     [] No change.     [] Other:      STG: (to be met in 6 treatments)  Able to isolate PF musculature  Elicit PF muscle contraction long enough to inhibit bladder contraction with cough, sneeze. laugh  ? Strength:PF 3/5  ? Function: no reports of leaking with cough, sneeze and laugh  Able to perform basic ADL's without PF pressure or prolapse  Independent with Home Exercise Programs    LTG: (to be met in 12 treatments)  PF strength 4/5  Able to perform all advanced ADL's without leaking  Able to perform all advanced ADL's without PF pressure or prolapse  Pt will report a 5 point improvement on ИВАН to indicate improved urogenital functioning.     Patient goals: Improved pelvic floor function    Pt. Education:  [x] Yes  [] No  [x] Reviewed Prior HEP/Ed  Method of Education: [x] Verbal  [x] Demo  [] Written  Comprehension of Education:  [x] Verbalizes understanding.  [x] Demonstrates understanding.  [] Needs review.  [] Demonstrates/verbalizes HEP/Ed previously given.     Plan: [x] Continue with current plan of care towards goals.        Time In: 3:00 pm            Time Out: 3:55 pm    Electronically signed by:  Alyce Vinson PTA

## 2024-03-19 ENCOUNTER — HOSPITAL ENCOUNTER (OUTPATIENT)
Dept: PHYSICAL THERAPY | Facility: CLINIC | Age: 39
Setting detail: THERAPIES SERIES
Discharge: HOME | End: 2024-03-19
Attending: PODIATRIST
Payer: COMMERCIAL

## 2024-03-19 PROCEDURE — 90912 BFB TRAINING 1ST 15 MIN: CPT

## 2024-03-19 PROCEDURE — 97110 THERAPEUTIC EXERCISES: CPT

## 2024-03-19 PROCEDURE — G0283 ELEC STIM OTHER THAN WOUND: HCPCS

## 2024-03-19 NOTE — FLOWSHEET NOTE
Contraction  - 1 x daily - 7 x weekly - 10 reps    Plan: [x] Continue with current plan of care towards goals.        Time In: 2:00 pm            Time Out: 3:59 pm    Electronically signed by:  Alyce Vinson PTA

## 2024-04-02 ENCOUNTER — HOSPITAL ENCOUNTER (OUTPATIENT)
Dept: PHYSICAL THERAPY | Facility: CLINIC | Age: 39
Setting detail: THERAPIES SERIES
Discharge: HOME | End: 2024-04-02
Attending: PODIATRIST
Payer: COMMERCIAL

## 2024-04-02 PROCEDURE — 97140 MANUAL THERAPY 1/> REGIONS: CPT

## 2024-04-02 PROCEDURE — 97110 THERAPEUTIC EXERCISES: CPT

## 2024-04-02 NOTE — FLOWSHEET NOTE
tension on (L), significant tension and trigger point on (R). Completed supine DI and SL active release with trigger point moving closer to tissue surface during clamshells. Tenderness post but resolve of c/o during ex. Cont to edu on TA bracing with fair carryover. Pt demos pelvic tilt compensations. Will continue to progress as appropriate.      [] No change.     [] Other:      STG: (to be met in 6 treatments)  Able to isolate PF musculature  Elicit PF muscle contraction long enough to inhibit bladder contraction with cough, sneeze. laugh  ? Strength:PF 3/5  ? Function: no reports of leaking with cough, sneeze and laugh  Able to perform basic ADL's without PF pressure or prolapse  Independent with Home Exercise Programs    LTG: (to be met in 12 treatments)  PF strength 4/5  Able to perform all advanced ADL's without leaking  Able to perform all advanced ADL's without PF pressure or prolapse  Pt will report a 5 point improvement on ИВАН to indicate improved urogenital functioning.     Patient goals: Improved pelvic floor function    Pt. Education:  [x] Yes  [] No  [x] Reviewed Prior HEP/Ed  Method of Education: [x] Verbal  [x] Demo  [] Written  Comprehension of Education:  [x] Verbalizes understanding.  [x] Demonstrates understanding.  [] Needs review.  [] Demonstrates/verbalizes HEP/Ed previously given.    Access Code: [email or web address]  URL: [email or web address]  Date: 04/02/2024  Prepared by: Alyce Vinson    Exercises  - Modified Nestor Stretch  - 1 x daily - 7 x weekly - 3 sets - 30 hold  - Supine Piriformis Stretch with Foot on Ground  - 1 x daily - 7 x weekly - 3 sets - 30 hold  - Supine Transversus Abdominis Bracing - Hands on Stomach  - 1 x daily - 7 x weekly - 10 reps  - Supine Pelvic Floor Contraction  - 1 x daily - 7 x weekly - 10 reps  - Pelvic Floor Contractions in Hooklying with Adduction  - 1 x daily - 7 x weekly - 10 reps  - Supine Bridge with Pelvic Floor Contraction  - 1 x daily - 7 x weekly - 10

## 2024-04-16 ENCOUNTER — HOSPITAL ENCOUNTER (OUTPATIENT)
Dept: PHYSICAL THERAPY | Facility: CLINIC | Age: 39
Setting detail: THERAPIES SERIES
Discharge: HOME | End: 2024-04-16
Attending: PODIATRIST
Payer: COMMERCIAL

## 2024-04-16 PROCEDURE — 97140 MANUAL THERAPY 1/> REGIONS: CPT

## 2024-04-16 PROCEDURE — 97110 THERAPEUTIC EXERCISES: CPT

## 2024-04-16 NOTE — FLOWSHEET NOTE
[x] [facility]  Outpatient Rehabilitation &  Therapy  [address]  P: [phone]  F: [phone]      Physical Therapy Daily Treatment Note    Date:  2024  Patient Name:  Kellen Vallejo    :  1985  MRN: [id number]  Physician: Karina Woods                                Insurance: [facility] (42/60 visits per benefit period remain)  Medical Diagnosis: Stress Incontinence N93.3                     Rehab Codes: N39.3, M62.50  Onset Date: 24                                  Next 's appt: PRN  Visit# / total visits:      Cancels/No Shows: 0/0    Subjective:    Pain:  [] Yes  [x] No Location: N/A  Pain Rating: (0-10 scale) 0/10  Pain altered Tx:  [] No  [] Yes  Action:  Comments: Pt reports she was sore in anterior (R) hip after last appt. Has been doing ok without her heel lift. Still some \"rolling\" from trigger point in anterior (R) hip. Is continuing to do kegels with andressa once-twice per day. Was able to hold bladder to go to upstairs bathroom with no leak.     Objective:  Modalities:   Precautions:  Exercises:  Exercise Reps/ Time Weight/ Level Comments    MET/shotgun    x          Modified praveen S 3x30\"      Piriformis S 3x30\"   x   SKTC  3x30\"   x   Happy baby  3x30\"   x          TA brace  x10   x   TA bent knee fallout       TA march  x10      PF bridge x10      PF SL clamshell  x10      PF adductor squeeze x10             SL TA brace x8 ea             Other:     Instructions for subsequent visits: ad holds to progressive ex, trial PF/TA co-contraction       Treatment Charges: Mins Units   [x]  Modalities: HP 10 1   [x]  Ther Exercise 10 1   [x]  Manual Therapy 30 2   []  Ther Activities     []  Aquatics     []  Vasocompression     []  Other: BFB     Total Treatment time 50 3       Assessment:   [x] Progressing toward goals. Pt presents in SIJ malalignment, corrected with long axis traction. Education provided on kegel weights for home use to continue

## 2024-04-22 ENCOUNTER — PROCEDURE VISIT (OUTPATIENT)
Age: 39
End: 2024-04-22
Payer: COMMERCIAL

## 2024-04-22 VITALS
SYSTOLIC BLOOD PRESSURE: 120 MMHG | BODY MASS INDEX: 43.13 KG/M2 | DIASTOLIC BLOOD PRESSURE: 74 MMHG | TEMPERATURE: 97.7 F | WEIGHT: 255.1 LBS | RESPIRATION RATE: 18 BRPM | HEART RATE: 83 BPM

## 2024-04-22 DIAGNOSIS — M99.01 SOMATIC DYSFUNCTION OF CERVICAL REGION: ICD-10-CM

## 2024-04-22 DIAGNOSIS — M99.02 SOMATIC DYSFUNCTION OF THORACIC REGION: ICD-10-CM

## 2024-04-22 DIAGNOSIS — M99.00 SOMATIC DYSFUNCTION OF HEAD REGION: ICD-10-CM

## 2024-04-22 DIAGNOSIS — M54.2 NECK PAIN: ICD-10-CM

## 2024-04-22 DIAGNOSIS — M99.03 SOMATIC DYSFUNCTION OF LUMBAR REGION: ICD-10-CM

## 2024-04-22 DIAGNOSIS — M25.512 ACUTE PAIN OF LEFT SHOULDER: Primary | ICD-10-CM

## 2024-04-22 PROCEDURE — 99213 OFFICE O/P EST LOW 20 MIN: CPT | Performed by: STUDENT IN AN ORGANIZED HEALTH CARE EDUCATION/TRAINING PROGRAM

## 2024-04-22 PROCEDURE — 98926 OSTEOPATH MANJ 3-4 REGIONS: CPT | Performed by: STUDENT IN AN ORGANIZED HEALTH CARE EDUCATION/TRAINING PROGRAM

## 2024-04-22 PROCEDURE — 98926 OSTEOPATH MANJ 3-4 REGIONS: CPT

## 2024-04-22 NOTE — PROGRESS NOTES
or concerns.     COMMUNICATION:     No follow-ups on file.    All questions/concerns answered. Patient verbalized and expressed understanding. Medications, laboratory testing, imaging, consultation, and follow up as documented in this encounter.     Electronically signed by Demetrius Uribe DO on 4/22/2024 at 11:22 AM

## 2024-04-23 ENCOUNTER — HOSPITAL ENCOUNTER (OUTPATIENT)
Dept: PHYSICAL THERAPY | Facility: CLINIC | Age: 39
Setting detail: THERAPIES SERIES
Discharge: HOME | End: 2024-04-23
Attending: PODIATRIST
Payer: COMMERCIAL

## 2024-04-23 PROCEDURE — G0283 ELEC STIM OTHER THAN WOUND: HCPCS

## 2024-04-23 PROCEDURE — 97110 THERAPEUTIC EXERCISES: CPT

## 2024-04-23 NOTE — FLOWSHEET NOTE
laps lime  x          Palloff press  2x10 15#  x   Other:     Instructions for subsequent visits: add holds to progressive ex, trial PF/TA co-contraction       Treatment Charges: Mins Units   [x]  Modalities: ES/HP 20/20 1/0   [x]  Ther Exercise 25 2   [x]  Manual Therapy     []  Ther Activities     []  Aquatics     []  Vasocompression     []  Other: BFB     Total Treatment time 45 3       Assessment:   [x] Progressing toward goals. Pt presents in SIJ malalignment, corrected with long axis traction. Per continued lumbar and hip tension initiated HP/ES to prevent further lumbar spasm. Continued to progress core and hip stability ex to aide in overall stability and reduce pressure through SIJ and lumbar spine as well as promote maintenance of SIJ alignment. Gentle manual release to anterior (R) hip and posterior (L) hip to continue reducing pain levels. Reviewed kegel ex for KIERAN. Will cont to monitor and progress as appropriate.    [] No change.     [] Other:      STG: (to be met in 6 treatments)  Able to isolate PF musculature  Elicit PF muscle contraction long enough to inhibit bladder contraction with cough, sneeze. laugh  ? Strength:PF 3/5  ? Function: no reports of leaking with cough, sneeze and laugh  Able to perform basic ADL's without PF pressure or prolapse  Independent with Home Exercise Programs    LTG: (to be met in 12 treatments)  PF strength 4/5  Able to perform all advanced ADL's without leaking  Able to perform all advanced ADL's without PF pressure or prolapse  Pt will report a 5 point improvement on ИВАН to indicate improved urogenital functioning.     Patient goals: Improved pelvic floor function    Pt. Education:  [x] Yes  [] No  [x] Reviewed Prior HEP/Ed  Method of Education: [x] Verbal  [x] Demo  [] Written  Comprehension of Education:  [x] Verbalizes understanding.  [x] Demonstrates understanding.  [] Needs review.  [] Demonstrates/verbalizes HEP/Ed previously given.    Access Code:

## 2024-04-30 ENCOUNTER — APPOINTMENT (OUTPATIENT)
Dept: PHYSICAL THERAPY | Facility: CLINIC | Age: 39
End: 2024-04-30
Attending: PODIATRIST
Payer: COMMERCIAL

## 2024-05-02 ENCOUNTER — HOSPITAL ENCOUNTER (OUTPATIENT)
Dept: PHYSICAL THERAPY | Facility: CLINIC | Age: 39
Setting detail: THERAPIES SERIES
Discharge: HOME | End: 2024-05-02
Attending: PODIATRIST
Payer: COMMERCIAL

## 2024-05-02 PROCEDURE — G0283 ELEC STIM OTHER THAN WOUND: HCPCS

## 2024-05-02 PROCEDURE — 97110 THERAPEUTIC EXERCISES: CPT

## 2024-05-02 NOTE — FLOWSHEET NOTE
2x10             Tband bridge 2x10 blue     TB SL clamshell  2x10 blue     TB SL hip abduction 2x10 blue            3-way hip  2x10  blue  x   Step ups 2x10 ea  Sm stroops     Heel taps  2x10 ea 4\"  x   Monster walks  3 laps lime            Palloff press  2x10 15#  x   Other:     Instructions for subsequent visits: cont to progress stability as guillermina, monitor KIERAN sx       Treatment Charges: Mins Units   [x]  Modalities: ES/HP 20/20 1/0   [x]  Ther Exercise 25 2   [x]  Manual Therapy     []  Ther Activities     []  Aquatics     []  Vasocompression     []  Other: BFB     Total Treatment time 45 3       Assessment:   [x] Progressing toward goals. Pt presents in SIJ malalignment, corrected with long axis traction. Modified placement of ES to lumbar paraspinals and (R) hip to address cont tension, and modified hip flexor stretch to improve ease of completion at home. Some cont stiffness post MHP/ES, added lumbar stretching and continued ex with good resolve. Continued cues to avoid locking LE joint and cues to promote posterior chain engagement vs lumbar compensations. Mm fatigue reported at end of tx but no inc pain. Will cont to monitor and progress as able.     [] No change.     [] Other:      STG: (to be met in 6 treatments)  Able to isolate PF musculature  Elicit PF muscle contraction long enough to inhibit bladder contraction with cough, sneeze. laugh  ? Strength:PF 3/5  ? Function: no reports of leaking with cough, sneeze and laugh  Able to perform basic ADL's without PF pressure or prolapse  Independent with Home Exercise Programs    LTG: (to be met in 12 treatments)  PF strength 4/5  Able to perform all advanced ADL's without leaking  Able to perform all advanced ADL's without PF pressure or prolapse  Pt will report a 5 point improvement on ИВАН to indicate improved urogenital functioning.     Patient goals: Improved pelvic floor function    Pt. Education:  [x] Yes  [] No  [x] Reviewed Prior HEP/Ed  Method of

## 2024-05-07 ENCOUNTER — HOSPITAL ENCOUNTER (OUTPATIENT)
Dept: PHYSICAL THERAPY | Facility: CLINIC | Age: 39
Setting detail: THERAPIES SERIES
Discharge: HOME | End: 2024-05-07
Attending: PODIATRIST
Payer: COMMERCIAL

## 2024-05-07 PROCEDURE — 97110 THERAPEUTIC EXERCISES: CPT

## 2024-05-07 NOTE — FLOWSHEET NOTE
Extension with Resistance Loop  - 1 x daily - 7 x weekly - 2 sets - 10 reps  - Hip Abduction with Resistance Loop  - 1 x daily - 7 x weekly - 2 sets - 10 reps  - Standing Hip Flexion with Resistance Loop  - 1 x daily - 7 x weekly - 2 sets - 10 reps  - Side Stepping with Resistance at Feet  - 1 x daily - 7 x weekly - 3 sets  - Half Deadlift with Kettlebell  - 1 x daily - 7 x weekly - 2 sets - 10 reps  - Lateral Step Up  - 1 x daily - 7 x weekly - 2 sets - 10 reps  - Marching with Resistance  - 1 x daily - 7 x weekly - 2 sets - 10 reps  - Squat with Chair Touch  - 1 x daily - 7 x weekly - 2 sets - 10 reps    Plan:   [x] Continue with current plan of care towards goals.        Time In: 3:25 pm            Time Out: 4:25 pm    Electronically signed by:  Alyce Vinson PTA

## 2024-05-14 ENCOUNTER — HOSPITAL ENCOUNTER (OUTPATIENT)
Dept: PHYSICAL THERAPY | Facility: CLINIC | Age: 39
Setting detail: THERAPIES SERIES
Discharge: HOME | End: 2024-05-14
Attending: PODIATRIST
Payer: COMMERCIAL

## 2024-05-14 PROCEDURE — 97110 THERAPEUTIC EXERCISES: CPT

## 2024-05-14 NOTE — FLOWSHEET NOTE
[x] [facility]  Outpatient Rehabilitation &  Therapy  [address]  P: [phone]  F: [phone]      Physical Therapy Daily Treatment Note    Date:  2024  Patient Name:  Kellen Vallejo    :  1985  MRN: [id number]  Physician: Karina Woods                                Insurance: [facility] (42/60 visits per benefit period remain)  Medical Diagnosis: Stress Incontinence N93.3                     Rehab Codes: N39.3, M62.50  Onset Date: 24                                  Next 's appt: PRN  Visit# / total visits:      Cancels/No Shows: 0/0    Subjective:    Pain:  [x] Yes  [] No Location: low back  Pain Rating: (0-10 scale) 3/10  Pain altered Tx:  [] No  [] Yes  Action:  Comments: Pt reports cont (L) anterior hip tension, low back was feeling ok prior to helping her day move into a new place and     Objective:  Modalities:   Precautions:  Exercises:  Exercise Reps/ Time Weight/ Level Comments    MET/shotgun    x          Stool hip flexor S 3x30\"   x   SB lumbar roll outs 10x10\"   x   Piriformis SB roll outs  10x10\"   x   SKTC  3x30\"      Happy baby  3x30\"             3-way hip  3x10  blue  x   Monster walks  3 laps blue  x   Tband hip flexion march  2x10 ea blue  x   Standing clamshell  2x10 blue Working foot on chair x   [place] split squat  2x10 ea   x          Lateral step up 2x10 8\"     Deadlift 2x10 12#  Body bar    Chair squat taps 2x10             TG squats x10 L 17  x   TG SL squats  x10 ea L 17  x   TG SL squats  x10 ea   x          Palloff press  2x10 20#  x   CC chop x10 20# Hold second set d/t shoulders  x   Palloff step out  2x10 blue  x          SB marches       SB LAQ       Other:     Instructions for subsequent visits: cont to progress stability as guillermina, monitor KIERAN sx       Treatment Charges: Mins Units   []  Modalities:      [x]  Ther Exercise 50 3   []  Manual Therapy     []  Ther Activities     []  Aquatics     []  Vasocompression     []

## 2024-05-17 ENCOUNTER — OFFICE VISIT (OUTPATIENT)
Age: 39
End: 2024-05-17
Payer: COMMERCIAL

## 2024-05-17 VITALS
BODY MASS INDEX: 42.94 KG/M2 | HEART RATE: 69 BPM | DIASTOLIC BLOOD PRESSURE: 79 MMHG | SYSTOLIC BLOOD PRESSURE: 120 MMHG | RESPIRATION RATE: 14 BRPM | TEMPERATURE: 97.4 F | WEIGHT: 254 LBS

## 2024-05-17 DIAGNOSIS — N64.52 NIPPLE DISCHARGE: Primary | ICD-10-CM

## 2024-05-17 DIAGNOSIS — F41.9 ANXIETY: ICD-10-CM

## 2024-05-17 PROCEDURE — 96127 BRIEF EMOTIONAL/BEHAV ASSMT: CPT

## 2024-05-17 PROCEDURE — 99213 OFFICE O/P EST LOW 20 MIN: CPT

## 2024-05-17 PROCEDURE — 99214 OFFICE O/P EST MOD 30 MIN: CPT

## 2024-05-17 RX ORDER — CLOBETASOL PROPIONATE 0.5 MG/G
1 CREAM TOPICAL PRN
COMMUNITY
Start: 2020-02-20

## 2024-05-17 ASSESSMENT — PATIENT HEALTH QUESTIONNAIRE - PHQ9
SUM OF ALL RESPONSES TO PHQ QUESTIONS 1-9: 2
3. TROUBLE FALLING OR STAYING ASLEEP: NOT AT ALL
SUM OF ALL RESPONSES TO PHQ QUESTIONS 1-9: 2
6. FEELING BAD ABOUT YOURSELF - OR THAT YOU ARE A FAILURE OR HAVE LET YOURSELF OR YOUR FAMILY DOWN: SEVERAL DAYS
9. THOUGHTS THAT YOU WOULD BE BETTER OFF DEAD, OR OF HURTING YOURSELF: NOT AT ALL
4. FEELING TIRED OR HAVING LITTLE ENERGY: SEVERAL DAYS
1. LITTLE INTEREST OR PLEASURE IN DOING THINGS: NOT AT ALL
SUM OF ALL RESPONSES TO PHQ QUESTIONS 1-9: 2
7. TROUBLE CONCENTRATING ON THINGS, SUCH AS READING THE NEWSPAPER OR WATCHING TELEVISION: NOT AT ALL
SUM OF ALL RESPONSES TO PHQ9 QUESTIONS 1 & 2: 0
SUM OF ALL RESPONSES TO PHQ QUESTIONS 1-9: 2
2. FEELING DOWN, DEPRESSED OR HOPELESS: NOT AT ALL
8. MOVING OR SPEAKING SO SLOWLY THAT OTHER PEOPLE COULD HAVE NOTICED. OR THE OPPOSITE, BEING SO FIGETY OR RESTLESS THAT YOU HAVE BEEN MOVING AROUND A LOT MORE THAN USUAL: NOT AT ALL
10. IF YOU CHECKED OFF ANY PROBLEMS, HOW DIFFICULT HAVE THESE PROBLEMS MADE IT FOR YOU TO DO YOUR WORK, TAKE CARE OF THINGS AT HOME, OR GET ALONG WITH OTHER PEOPLE: NOT DIFFICULT AT ALL
5. POOR APPETITE OR OVEREATING: NOT AT ALL

## 2024-05-17 ASSESSMENT — ANXIETY QUESTIONNAIRES
3. WORRYING TOO MUCH ABOUT DIFFERENT THINGS: NOT AT ALL
1. FEELING NERVOUS, ANXIOUS, OR ON EDGE: NOT AT ALL
GAD7 TOTAL SCORE: 0
7. FEELING AFRAID AS IF SOMETHING AWFUL MIGHT HAPPEN: NOT AT ALL
6. BECOMING EASILY ANNOYED OR IRRITABLE: NOT AT ALL
IF YOU CHECKED OFF ANY PROBLEMS ON THIS QUESTIONNAIRE, HOW DIFFICULT HAVE THESE PROBLEMS MADE IT FOR YOU TO DO YOUR WORK, TAKE CARE OF THINGS AT HOME, OR GET ALONG WITH OTHER PEOPLE: NOT DIFFICULT AT ALL
5. BEING SO RESTLESS THAT IT IS HARD TO SIT STILL: NOT AT ALL
4. TROUBLE RELAXING: NOT AT ALL

## 2024-05-17 NOTE — PROGRESS NOTES
PHYSICAL EXAM     /79 (Site: Right Upper Arm, Position: Sitting)   Pulse 69   Temp 97.4 °F (36.3 °C) (Oral)   Resp 14   Wt 115.2 kg (254 lb)   BMI 42.94 kg/m²    Physical Exam  Exam conducted with a chaperone present.   Constitutional:       Appearance: She is not ill-appearing or diaphoretic.   HENT:      Head: Normocephalic and atraumatic.   Eyes:      Extraocular Movements: Extraocular movements intact.   Cardiovascular:      Rate and Rhythm: Normal rate and regular rhythm.      Pulses: Normal pulses.      Heart sounds: Normal heart sounds. No murmur heard.  Pulmonary:      Effort: Pulmonary effort is normal. No respiratory distress.      Breath sounds: Normal breath sounds. No wheezing or rales.   Chest:      Chest wall: No mass, lacerations, deformity or tenderness.   Breasts:     Breasts are symmetrical.      Right: Normal. No bleeding, mass, nipple discharge, skin change or tenderness.      Left: Normal. No bleeding, mass, nipple discharge, skin change or tenderness.   Abdominal:      General: Bowel sounds are normal.   Lymphadenopathy:      Upper Body:      Right upper body: No supraclavicular or axillary adenopathy.      Left upper body: No supraclavicular or axillary adenopathy.   Skin:     General: Skin is warm and dry.      Coloration: Skin is not jaundiced.      Findings: No bruising, erythema or rash.   Neurological:      Mental Status: She is alert and oriented to person, place, and time.   Psychiatric:         Mood and Affect: Mood normal.         Thought Content: Thought content normal.         Judgment: Judgment normal.         ASSESSMENT/PLAN     1. Nipple discharge  2. Anxiety     - Nipple discharge likely physiological secondary to pressure during self breast exam  - breast exam negative for erythema, rash, skin changes, nipple discharge, masses, or lymphadenopathy noted  - patient less likely pregnant secondary to menses on 5/6/2024  - will monitor at this time with follow up in

## 2024-05-20 ASSESSMENT — ENCOUNTER SYMPTOMS
VOMITING: 0
SHORTNESS OF BREATH: 0
CHEST TIGHTNESS: 0
ABDOMINAL PAIN: 0
NAUSEA: 0
COLOR CHANGE: 0
CONSTIPATION: 0
DIARRHEA: 0

## 2024-05-21 ENCOUNTER — HOSPITAL ENCOUNTER (OUTPATIENT)
Dept: PHYSICAL THERAPY | Facility: CLINIC | Age: 39
Setting detail: THERAPIES SERIES
Discharge: HOME | End: 2024-05-21
Attending: PODIATRIST
Payer: COMMERCIAL

## 2024-05-21 PROCEDURE — 97110 THERAPEUTIC EXERCISES: CPT

## 2024-05-21 NOTE — FLOWSHEET NOTE
[x] [facility]  Outpatient Rehabilitation &  Therapy  [address]  P: [phone]  F: [phone]      Physical Therapy Daily Treatment Note    Date:  2024  Patient Name:  Kellen Vallejo    :  1985  MRN: [id number]  Physician: Karina Woods                                Insurance: [facility] (42/60 visits per benefit period remain)  Medical Diagnosis: Stress Incontinence N93.3                     Rehab Codes: N39.3, M62.50  Onset Date: 24                                  Next 's appt: PRN  Visit# / total visits:      Cancels/No Shows: 0/0    Subjective:    Pain:  [x] Yes  [] No Location: low back  Pain Rating: (0-10 scale) 3/10  Pain altered Tx:  [] No  [] Yes  Action:  Comments: Pt reports she continues to feel unstable overall, and knows her SIJ is out because she felt it slip. Less pain in her back when sleeping and laying down. Reports she has been struggling PVD. Has been completing kegels with weights at home in supine d/t standing being too difficult.     Objective:  Modalities:   Precautions: cannot tolerate mat ex  Exercises:  Exercise Reps/ Time Weight/ Level Comments    MET/shotgun    x          Stool hip flexor S 3x30\"   x   SB lumbar roll outs 10x10\"   x   Piriformis SB roll outs  10x10\"   x          3-way hip  3x10  blue  x   Monster walks  3 laps blue  x   Tband hip flexion march  2x10 ea blue  x   Standing clamshell  3x10 blue Working foot on chair x   [place] split squat  2x10 ea   x   Chair squat taps 2x10   x          TG squats x10 L 18  x   TG SL squats  x10 ea L 18  x   TG SL squats  x10 ea L 18  x          Palloff press  2x10 20#  x   CC chop x10 20# Hold second set d/t shoulders  -   Palloff step out  2x10 blue [facility] out x          SB marches 2x10  Green sb  x   SB LAQ 2x10 ea  Green sb  x   SB op arm, op leg 2x10  Green sb  x   Other:     Instructions for subsequent visits: cont to progress core/hip stability as guillermina,

## 2024-05-31 ENCOUNTER — HOSPITAL ENCOUNTER (OUTPATIENT)
Dept: PHYSICAL THERAPY | Facility: CLINIC | Age: 39
Setting detail: THERAPIES SERIES
Discharge: HOME | End: 2024-05-31
Attending: PODIATRIST
Payer: COMMERCIAL

## 2024-05-31 PROCEDURE — 97110 THERAPEUTIC EXERCISES: CPT

## 2024-05-31 NOTE — FLOWSHEET NOTE
guillermina, monitor KIERAN sx       Treatment Charges: Mins Units   []  Modalities:      [x]  Ther Exercise 50 3   []  Manual Therapy     []  Ther Activities     []  Aquatics     []  Vasocompression     []  Other: BFB     Total Treatment time 50 3       Assessment:   [x] Progressing toward goals. Pt presents in SIJ malalignment, corrected with MET. Initiated mat ex with modified praveen stretch, inc pain post to \"3.5/10.\" Held further mat ex and cont with standing program to further SIJ stability, reduce lumbar pain, and improve core strength. Will cont to monitor sx and progress as guillermina.     [] No change.     [] Other:      STG: (to be met in 6 treatments)  Able to isolate PF musculature - MET  Elicit PF muscle contraction long enough to inhibit bladder contraction with cough, sneeze. Laugh - not met for prolonged coughing  ? Strength: PF 3/5   ? Function: no reports of leaking with cough, sneeze and laugh - progressing   Able to perform basic ADL's without PF pressure or prolapse - met   Independent with Home Exercise Programs - met     LTG: (to be met in 12 treatments)  PF strength 4/5  Able to perform all advanced ADL's without leaking - not met for running or jumping   Able to perform all advanced ADL's without PF pressure or prolapse - met   Pt will report a 5 point improvement on ИВАН to indicate improved urogenital functioning. - ИВАН score has regressed      Patient goals: Improved pelvic floor function    Pt. Education:  [x] Yes  [] No  [x] Reviewed Prior HEP/Ed  Method of Education: [x] Verbal  [x] Demo  [] Written  Comprehension of Education:  [x] Verbalizes understanding.  [x] Demonstrates understanding.  [] Needs review.  [] Demonstrates/verbalizes HEP/Ed previously given.    Access Code: [email or web address]  URL: [email or web address]  Date: 05/07/2024  Prepared by: Alyce Vinson    Exercises  - Seated 3 Way Exercise Ball Roll Out Stretch  - 1 x daily - 7 x weekly - 3 sets - 10 reps  - Hip Flexor Stretch on Step  - 1 x

## 2024-06-13 ENCOUNTER — HOSPITAL ENCOUNTER (OUTPATIENT)
Dept: PHYSICAL THERAPY | Facility: CLINIC | Age: 39
Setting detail: THERAPIES SERIES
Discharge: HOME | End: 2024-06-13
Attending: PODIATRIST
Payer: COMMERCIAL

## 2024-06-13 PROCEDURE — 97110 THERAPEUTIC EXERCISES: CPT

## 2024-06-13 NOTE — FLOWSHEET NOTE
[x] [facility]  Outpatient Rehabilitation &  Therapy  [address]  P: [phone]  F: [phone]      Physical Therapy Daily Treatment Note    Date:  2024  Patient Name:  Kellen Vallejo    :  1985  MRN: [id number]  Physician: Karina Woods                                Insurance: [facility] (42/60 visits per benefit period remain)  Medical Diagnosis: Stress Incontinence N93.3                     Rehab Codes: N39.3, M62.50  Onset Date: 24                                  Next 's appt: PRN  Visit# / total visits:      Cancels/No Shows: 0/0    Subjective:    Pain:  [x] Yes  [] No Location: low back  Pain Rating: (0-10 scale) 2-3/10  Pain altered Tx:  [] No  [] Yes  Action:  Comments: Pt reports she has been busy on vacation. Had some low back pain when walking on the beach but resolved with stretching. Bending over has kind of helped with PVD, had to hold her bladder waiting for rest stop and was able to hold it but cont to have KIERAN with coughing fits. Tailbone pain has settled down but still feeling some pull above tailbone.       Objective:  Modalities:   Precautions: cannot tolerate mat ex d/t inc pain   Exercises:  Exercise Reps/ Time Weight/ Level Comments    MET/shotgun    x          Stool hip flexor S 3x30\"  Tried modified praveen     SB lumbar roll outs 10x10\"   x   Piriformis SB roll outs  10x10\"   x          Seated adductor/PF  3\"x10   x   PF sit to stand  5x   x          3-way hip  2x10  plum  x   Monster walks  3 laps plum  x   Chair squat taps 2x10 plum  x   [place] split squat X10 ea  SL deadlift style  x          SB wall squats 2x10             Palloff press  2x10 blue  x   Palloff step out  2x10 blue [facility] out x          SB marches 2x10  Green sb     SB LAQ 2x10 ea  Green sb     SB op arm, op leg 2x10  Green sb            Modified grasshopper 2x10  Red SB           Other:     Instructions for subsequent visits: cont to progress

## 2024-06-18 ENCOUNTER — HOSPITAL ENCOUNTER (OUTPATIENT)
Dept: PHYSICAL THERAPY | Facility: CLINIC | Age: 39
Setting detail: THERAPIES SERIES
Discharge: HOME | End: 2024-06-18
Attending: PODIATRIST
Payer: COMMERCIAL

## 2024-06-18 PROCEDURE — 97110 THERAPEUTIC EXERCISES: CPT

## 2024-06-18 NOTE — FLOWSHEET NOTE
[x] [facility]  Outpatient Rehabilitation &  Therapy  [address]  P: [phone]  F: [phone]      Physical Therapy Daily Treatment Note    Date:  2024  Patient Name:  Kellen Vallejo    :  1985  MRN: [id number]  Physician: Karina Woods                                Insurance: [facility] (42/60 visits per benefit period remain)  Medical Diagnosis: Stress Incontinence N93.3                     Rehab Codes: N39.3, M62.50  Onset Date: 24                                  Next 's appt: PRN  Visit# / total visits: 15/24     Cancels/No Shows: 0/0    Subjective:    Pain:  [x] Yes  [] No Location: low back  Pain Rating: (0-10 scale) 2-3/10  Pain altered Tx:  [] No  [] Yes  Action:  Comments: Pt reports she is having a little bit of back pain, but cont to be low level with occ use of heating pad for relief. Cont stretching with fair resolve. L upper glut in medial area having some pulling. R anterior hip is less painful overall.        Objective:  Modalities:   Precautions: cannot tolerate mat ex d/t inc pain   Exercises:  Exercise Reps/ Time Weight/ Level Comments    MET/shotgun    x          Stool hip flexor S 3x30\"  Tried modified praveen     SB lumbar roll outs 10x10\"   x   Piriformis SB roll outs  10x10\"   x          Seated PF quick flicks x10   x   Seated adductor/PF  3\"x10   x   PF adduction STS 5x   x          3-way hip  2x15 plum  x   Monster walks  3 laps plum  x   Chair squat taps 2x10  Tall stroops x   [place] split squat X10 ea  SL deadlift style  x          SB wall squats 2x10             Palloff press  2x10 green  x   Palloff step out  2x10 green [facility] out x          [place] carry  1 lap ea 15#  x   Other:     Instructions for subsequent visits: cont to progress functional PF, core/hip stability as guillermina    Treatment Charges: Mins Units   []  Modalities:      [x]  Ther Exercise 40 3   []  Manual Therapy     []  Ther Activities     []

## 2024-06-26 ENCOUNTER — HOSPITAL ENCOUNTER (OUTPATIENT)
Dept: PHYSICAL THERAPY | Facility: CLINIC | Age: 39
Setting detail: THERAPIES SERIES
Discharge: HOME | End: 2024-06-26
Attending: PODIATRIST
Payer: COMMERCIAL

## 2024-06-26 PROCEDURE — G0283 ELEC STIM OTHER THAN WOUND: HCPCS

## 2024-06-26 PROCEDURE — 97110 THERAPEUTIC EXERCISES: CPT

## 2024-06-26 PROCEDURE — 97035 APP MDLTY 1+ULTRASOUND EA 15: CPT

## 2024-06-26 NOTE — FLOWSHEET NOTE
[x] [facility]  Outpatient Rehabilitation &  Therapy  [address]  P: [phone]  F: [phone]     Physical Therapy Daily Treatment Note    Date:  2024  Patient Name:  Kellen Vallejo    :  1985  MRN: [id number]  Physician: Karina Woods                                Insurance: [facility] (42/60 visits per benefit period remain)  Medical Diagnosis: Stress Incontinence N93.3                     Rehab Codes: N39.3, M62.50  Onset Date: 24                                  Next 's appt: PRN  Visit# / total visits:      Cancels/No Shows: 0/0    Subjective:    Pain:  [x] Yes  [] No Location: low back  Pain Rating: (0-10 scale) 2-3/10  Pain altered Tx:  [] No  [] Yes  Action:  Comments: Pt reports she is having a little bit of back pain, but cont to be low level with occ use of heating pad for relief. Cont stretching with fair resolve. L upper glut in medial area having some pulling. R anterior hip is less painful overall.        Objective:  Modalities:   Precautions: cannot tolerate mat ex d/t inc pain   Exercises:  Exercise Reps/ Time Weight/ Level Comments    MET/shotgun    x          Stool hip flexor S 3x30\"  Tried modified praveen     SB lumbar roll outs 10x10\"   x   Piriformis SB roll outs  10x10\"   x          Seated PF quick flicks x10   x   Seated adductor/PF  3\"x10   x   PF adduction STS 5x   x          3-way hip  2x15 plum  x   Monster walks  3 laps plum  x   Chair squat taps 2x10  Tall stroops x   [place] split squat X10 ea  SL deadlift style  x          SB wall squats 2x10             Palloff press  2x10 green  x   Palloff step out  2x10 green [facility] out x          [place] carry  1 lap ea 15#  x   Other:     Instructions for subsequent visits: cont to progress functional PF, core/hip stability as guillermina    Treatment Charges: Mins Units   [x]  Modalities: HP/ES 20 1   US 10 1   [x]  Ther Exercise 10 1   [x]  Manual Therapy 5 0   []  Ther

## 2024-06-27 ENCOUNTER — APPOINTMENT (OUTPATIENT)
Dept: PHYSICAL THERAPY | Facility: CLINIC | Age: 39
End: 2024-06-27
Attending: PODIATRIST
Payer: COMMERCIAL

## 2024-07-05 ENCOUNTER — HOSPITAL ENCOUNTER (OUTPATIENT)
Dept: PHYSICAL THERAPY | Facility: CLINIC | Age: 39
Setting detail: THERAPIES SERIES
Discharge: HOME | End: 2024-07-05
Attending: PODIATRIST
Payer: COMMERCIAL

## 2024-07-05 PROCEDURE — G0283 ELEC STIM OTHER THAN WOUND: HCPCS

## 2024-07-05 PROCEDURE — 97035 APP MDLTY 1+ULTRASOUND EA 15: CPT

## 2024-07-05 PROCEDURE — 97110 THERAPEUTIC EXERCISES: CPT

## 2024-07-05 NOTE — FLOWSHEET NOTE
[x] [facility]  Outpatient Rehabilitation &  Therapy  [address]  P: [phone]  F: [phone]     Physical Therapy Daily Treatment Note    Date:  2024  Patient Name:  Kellen Vallejo    :  1985  MRN: [id number]  Physician: Karina Woods                                Insurance: [facility] (42/60 visits per benefit period remain)  Medical Diagnosis: Stress Incontinence N93.3                     Rehab Codes: N39.3, M62.50  Onset Date: 24                                  Next 's appt: PRN  Visit# / total visits:      Cancels/No Shows: 0/0    Subjective:    Pain:  [x] Yes  [] No Location: low back  Pain Rating: (0-10 scale) 3/10  Pain altered Tx:  [] No  [] Yes  Action:  Comments: Pt reports she has been able to inc kegel weight. Reports she is having menstrual cramps this date and she feels inc pain in LB d/t cramping.     Objective:  Modalities:   Treatment Location  Left      Right                          Position    []          []  [] Supine    [] Prone   [] Side lying  [] Sitting          Treatment Modality    Hot Pack:    [x] Large   [] Medium    [x] Cervical     _____ min    Electrical Stim:    [] IFC     [] MFAC      [x] HVPC                          Protocol:_______  _____X_____min                          #Channels:  [] 1        [] 2       [] Other: (L piriformis, R LB)    Ultrasound: ______W/cm2 x  ___10___min              [x] 1MHz   [] 3Mhz                      Duty Factor: [x] 100%  [] 50%   [] 20%                  Add: [] Lidex   [] Stim    [] Gel pad   (L lateral hip)        Precautions: cannot tolerate mat ex d/t inc pain   Exercises:  Exercise Reps/ Time Weight/ Level Comments    MET/shotgun 10 x 5\"  Innominate/pubic x   Supine PRC ex 20x   x   Piriformis stretches 3 x 30\"  bilateral x   Stool hip flexor S 3x30\"  Tried modified praveen     SB lumbar roll outs 10x10\"      Piriformis SB roll outs  10x10\"             Bridge x20   x   SL

## 2024-07-11 ENCOUNTER — HOSPITAL ENCOUNTER (OUTPATIENT)
Dept: PHYSICAL THERAPY | Facility: CLINIC | Age: 39
Setting detail: THERAPIES SERIES
Discharge: HOME | End: 2024-07-11
Attending: PODIATRIST
Payer: COMMERCIAL

## 2024-07-11 PROCEDURE — 97110 THERAPEUTIC EXERCISES: CPT

## 2024-07-11 NOTE — FLOWSHEET NOTE
[x] [facility]  Outpatient Rehabilitation &  Therapy  [address]  P: [phone]  F: [phone]     Physical Therapy Daily Treatment Note    Date:  2024  Patient Name:  Kellen Vallejo    :  1985  MRN: [id number]  Physician: Karina Woods                                Insurance: [facility] (42/60 visits per benefit period remain)  Medical Diagnosis: Stress Incontinence N93.3                     Rehab Codes: N39.3, M62.50  Onset Date: 24                                  Next 's appt: PRN  Visit# / total visits:      Cancels/No Shows: 0/0    Subjective:    Pain:  [x] Yes  [] No Location: low back  Pain Rating: (0-10 scale) 3/10  Pain altered Tx:  [] No  [] Yes  Action:  Comments: Pt reports she had inc stiffness after last appt through the next morning. Had to take it easy for a few days.     Objective:  Modalities:   Treatment Location  Left      Right                          Position    []          []  [] Supine    [] Prone   [] Side lying  [] Sitting          Treatment Modality    Hot Pack:    [x] Large   [] Medium    [x] Cervical     _____ min    Electrical Stim:    [] IFC     [] MFAC      [x] HVPC                          Protocol:_______  _____X_____min                          #Channels:  [] 1        [] 2       [] Other: (L piriformis, R LB)    Ultrasound: ______W/cm2 x  ___10___min              [x] 1MHz   [] 3Mhz                      Duty Factor: [x] 100%  [] 50%   [] 20%                  Add: [] Lidex   [] Stim    [] Gel pad   (L lateral hip)        Precautions: cannot tolerate mat ex d/t inc pain   Exercises:  Exercise Reps/ Time Weight/ Level Comments    MET/shotgun 10 x 5\"  Innominate/pubic x   Supine PRC ex 20x      Piriformis stretches 3 x 30\"  bilateral    Stool hip flexor S 3x30\"   x   SB lumbar roll outs 10x10\"   x   Piriformis SB roll outs  10x10\"   x          Bridge x20      SL clamshells  x20      SL hip abduction x20             Seated PF

## 2024-07-23 ENCOUNTER — HOSPITAL ENCOUNTER (OUTPATIENT)
Dept: PHYSICAL THERAPY | Facility: CLINIC | Age: 39
Setting detail: THERAPIES SERIES
Discharge: HOME | End: 2024-07-23
Attending: PODIATRIST
Payer: COMMERCIAL

## 2024-07-23 PROCEDURE — 97110 THERAPEUTIC EXERCISES: CPT

## 2024-07-23 PROCEDURE — G0283 ELEC STIM OTHER THAN WOUND: HCPCS

## 2024-07-23 NOTE — FLOWSHEET NOTE
[x] [facility]  Outpatient Rehabilitation &  Therapy  [address]  P: [phone]  F: [phone]     Physical Therapy Daily Treatment Note    Date:  2024  Patient Name:  Kellen Vallejo    :  1985  MRN: [id number]  Physician: Karina Woods                                Insurance: [facility] (42/60 visits per benefit period remain)  Medical Diagnosis: Stress Incontinence N93.3                     Rehab Codes: N39.3, M62.50  Onset Date: 24                                  Next 's appt: PRN  Visit# / total visits:      Cancels/No Shows: 0/0    Subjective:    Pain:  [x] Yes  [] No Location: low back  Pain Rating: (0-10 scale) 3/10  Pain altered Tx:  [] No  [] Yes  Action:  Comments: Pt reports she had some inc LBP after working in the yard over the weekend. Stretching helped. SIJ does not feel out of place but has mild discomfort on L low back/sacral area. Is limited in activity levels from lingering sx from metatarsal fracture. Has worked her way up to second weight in pelvic floor kit and able to work with it standing.     Objective:  Modalities:   Treatment Location  Left      Right                          Position    []          []  [] Supine    [] Prone   [] Side lying  [] Sitting          Treatment Modality    Hot Pack:    [x] Large   [] Medium    [x] Cervical     _____ min    Electrical Stim:    [] IFC     [] MFAC      [x] HVPC                          Protocol:_______  _____X_____min                          #Channels:  [] 1        [] 2       [] Other: (L piriformis, R LB)    Ultrasound: ______W/cm2 x  ___10___min              [x] 1MHz   [] 3Mhz                      Duty Factor: [x] 100%  [] 50%   [] 20%                  Add: [] Lidex   [] Stim    [] Gel pad   (L lateral hip)        Precautions: cannot tolerate mat ex d/t inc pain; cannot tolerate massage gun  Exercises:  Exercise Reps/ Time Weight/ Level Comments    MET/shotgun 10 x 5\"  Innominate/pubic

## 2024-07-30 ENCOUNTER — HOSPITAL ENCOUNTER (OUTPATIENT)
Dept: PHYSICAL THERAPY | Facility: CLINIC | Age: 39
Setting detail: THERAPIES SERIES
Discharge: HOME | End: 2024-07-30
Attending: PODIATRIST
Payer: COMMERCIAL

## 2024-07-30 PROCEDURE — 97110 THERAPEUTIC EXERCISES: CPT

## 2024-07-30 NOTE — FLOWSHEET NOTE
[x] [facility]  Outpatient Rehabilitation &  Therapy  [address]  P: [phone]  F: [phone]     Physical Therapy Daily Treatment Note    Date:  2024  Patient Name:  Kellen Vallejo    :  1985  MRN: [id number]  Physician: Karina Woods                                Insurance: [facility] (42/60 visits per benefit period remain)  Medical Diagnosis: Stress Incontinence N93.3                     Rehab Codes: N39.3, M62.50  Onset Date: 24                                  Next 's appt: PRN  Visit# / total visits:      Cancels/No Shows: 0/0    Subjective:    Pain:  [] Yes  [x] No Location: low back  Pain Rating: (0-10 scale) 0/10  Pain altered Tx:  [] No  [] Yes  Action:  Comments: Pt reports she had \"a little bit of discomfort\" in her back over the last couple days. Not in enough discomfort to rate as pain. Has been doing whatever she can from HEP with  being gone she has been busier.     Objective:  Modalities:   Treatment Location  Left      Right                          Position    []          []  [] Supine    [] Prone   [] Side lying  [] Sitting          Treatment Modality    Hot Pack:    [x] Large   [] Medium    [x] Cervical     _____ min    Electrical Stim:    [] IFC     [] MFAC      [x] HVPC                          Protocol:_______  _____X_____min                          #Channels:  [] 1        [] 2       [] Other: (L piriformis, R LB)    Ultrasound: ______W/cm2 x  ___10___min              [x] 1MHz   [] 3Mhz                      Duty Factor: [x] 100%  [] 50%   [] 20%                  Add: [] Lidex   [] Stim    [] Gel pad   (L lateral hip)        Precautions: cannot tolerate mat ex d/t inc pain; cannot tolerate massage gun  Exercises:  Exercise Reps/ Time Weight/ Level Comments    MET/shotgun 10 x 5\"  Innominate/pubic x   Supine PRC ex 20x      Piriformis stretches 3 x 30\"  bilateral    Stool hip flexor S 3x30\"   x   SB lumbar roll outs 10x10\"

## 2024-08-06 ENCOUNTER — HOSPITAL ENCOUNTER (OUTPATIENT)
Dept: PHYSICAL THERAPY | Facility: CLINIC | Age: 39
Setting detail: THERAPIES SERIES
Discharge: HOME | End: 2024-08-06
Attending: PODIATRIST
Payer: COMMERCIAL

## 2024-08-06 PROCEDURE — 97110 THERAPEUTIC EXERCISES: CPT

## 2024-08-06 NOTE — FLOWSHEET NOTE
HP/ES/US for pain control and inflammation. Cont with, Stretches, manual work and progressive functional PF, core/hip stability exercises.    Treatment Charges: Mins Units   [x]  Modalities: ES     [x]  Ther Exercise 50 3   []  Manual Therapy     []  Ther Activities     []  Aquatics     []  Vasocompression     []  Other: BFB     Total Treatment time 50 3     Assessment:   [x] Progressing toward goals. Cont SIJ malalignment, corrected with MET. Continued stretches per log above. Able to progress functional pelvic floor ex with addition of mini squats. Fatigue quickly but good tolerance. Added goblet squats with improved maintenance of upright posture assessed. Corrected SIJ alignment again after appt per pt request, out of alignment again so corrected. Will cont to progress PF functional strengthening and global hip/core strengthening to promote progress toward goals.     [] No change.     [] Other:    STG: (to be met in 6 treatments)  Able to isolate PF musculature - MET  Elicit PF muscle contraction long enough to inhibit bladder contraction with cough, sneeze. Laugh - not met for prolonged coughing  ? Strength: PF 3/5   ? Function: no reports of leaking with cough, sneeze and laugh - progressing   Able to perform basic ADL's without PF pressure or prolapse - met   Independent with Home Exercise Programs - met     LTG: (to be met in 12 treatments)  PF strength 4/5  Able to perform all advanced ADL's without leaking - not met for running or jumping   Able to perform all advanced ADL's without PF pressure or prolapse - met   Pt will report a 5 point improvement on ИВАН to indicate improved urogenital functioning. - ИВАН score has regressed      Patient goals: Improved pelvic floor function    Pt. Education:  [x] Yes  [] No  [x] Reviewed Prior HEP/Ed  Method of Education: [x] Verbal  [x] Demo  [x] Written (MET/PRC ex)  Comprehension of Education:  [x] Verbalizes understanding.  [x] Demonstrates understanding.  [] Needs

## 2024-08-15 ENCOUNTER — HOSPITAL ENCOUNTER (OUTPATIENT)
Dept: PHYSICAL THERAPY | Facility: CLINIC | Age: 39
Setting detail: THERAPIES SERIES
Discharge: HOME | End: 2024-08-15
Attending: PODIATRIST
Payer: COMMERCIAL

## 2024-08-15 PROCEDURE — 97110 THERAPEUTIC EXERCISES: CPT

## 2024-08-15 NOTE — FLOWSHEET NOTE
[x] [facility]  Outpatient Rehabilitation &  Therapy  [address]  P: [phone]  F: [phone]     Physical Therapy Daily Treatment Note    Date:  8/15/2024  Patient Name:  Kellen Vallejo    :  1985  MRN: [id number]  Physician: Karina Woods                                Insurance: [facility] (42/60 visits per benefit period remain)  Medical Diagnosis: Stress Incontinence N93.3                     Rehab Codes: N39.3, M62.50  Onset Date: 24                                  Next 's appt: PRN  Visit# / total visits:      Cancels/No Shows: 0/0    Subjective:    Pain:  [] Yes  [x] No Location: low back  Pain Rating: (0-10 scale) 0/10  Pain altered Tx:  [] No  [] Yes  Action:  Comments: Pt reports she is having some soreness in R lumbar and SIJ area, completed stretches with some improvement.     Objective:  Modalities:   Precautions: cannot tolerate mat ex d/t inc pain; cannot tolerate massage gun  Exercises:  Exercise Reps/ Time Weight/ Level Comments    MET/shotgun 10 x 5\"  Innominate/pubic x   Supine PRC ex 20x      Piriformis stretches 3 x 30\"  bilateral    Stool hip flexor S 3x30\"   x   SB lumbar roll outs 10x10\"   x   Piriformis SB roll outs  10x10\"   x          Bridge x20      SL clamshells  x20      SL hip abduction x20             Seated PF quick flicks x20      Seated adductor/PF  3\"x10      PF adduction STS 10x   x   PF step ups  10x ea 6\"  x   PF mini squats  x10   x   PF lateral step outs X10 ea   x   PF mini jumps 3x3   x          3-way hip  2x15 plum  x   Hip circles x10 ea plum  x   Monster walks  3 laps plum  x   Sumo squat  x10 10#  x   Deadlift 2x10  10# weights x   Split squat x8 ea   x   Step ups X10 ea Sm stroop 10# weights x   CC chops 2x10 25#  x   Palloff press  2x10 25#  --   Palloff twist 2x10 green Hands longterm out x          PF/TA farmers carry  2 L ea 20#  x   Other:     Instructions for subsequent visits: update HEP before final

## 2024-08-27 ENCOUNTER — HOSPITAL ENCOUNTER (OUTPATIENT)
Dept: PHYSICAL THERAPY | Facility: CLINIC | Age: 39
Setting detail: THERAPIES SERIES
Discharge: HOME | End: 2024-08-27
Attending: PODIATRIST
Payer: COMMERCIAL

## 2024-08-27 PROCEDURE — 97110 THERAPEUTIC EXERCISES: CPT

## 2024-08-27 NOTE — FLOWSHEET NOTE
[x] [facility]  Outpatient Rehabilitation &  Therapy  [address]  P: [phone]  F: [phone]     Physical Therapy Daily Treatment Note    Date:  2024  Patient Name:  Kellen Vallejo    :  1985  MRN: [id number]  Physician: Karina Woods                                Insurance: [employer] (60 per benefit period- resets in April -  in )  Medical Diagnosis: Stress Incontinence N93.3                     Rehab Codes: N39.3, M62.50  Onset Date: 24                                  Next 's appt: PRN  Visit# / total visits:      Cancels/No Shows: 0/0    Subjective:    Pain:  [] Yes  [x] No Location: low back  Pain Rating: (0-10 scale) 0/10  Pain altered Tx:  [] No  [] Yes  Action:  Comments: Pt reports some LB twinges and back ache. Overall doing well, some menstrual cramps this date. Feels comfortable being placed on hold after next visit. [place] through zoo visit the other day needed to stop and work on hip mobility d/t soreness. Occasionally waits too long to go to restroom and isn't able to urge defer long enough to get clothing taken care of. Is able to complete clamshells and SL hip abduction at home without pulling or pain.     Objective:  Modalities:   Precautions: cannot tolerate mat ex d/t inc pain; cannot tolerate massage gun  Exercises:  Exercise Reps/ Time Weight/ Level Comments    MET/shotgun 10 x 5\"  Innominate/pubic x   Supine PRC ex 20x      Piriformis stretches 3 x 30\"  bilateral    Stool hip flexor S 3x30\"   x   SB lumbar roll outs 10x10\"   x   Piriformis SB roll outs  10x10\"   x          Bridge x20      SL clamshells  x20   x   SL hip abduction x20   x          Seated PF quick flicks x20      Seated adductor/PF  3\"x10      PF STS 12x   x   PF step ups  10x ea 6\"     PF mini squats  x10  3 then KIERAN, 7 with no further issue x   PF lateral step outs X10 ea   x   PF mini jumps 3x3   x          3-way hip  2x15 plum  x   Hip circles x10 ea

## 2024-09-05 ENCOUNTER — HOSPITAL ENCOUNTER (OUTPATIENT)
Dept: PHYSICAL THERAPY | Facility: CLINIC | Age: 39
Setting detail: THERAPIES SERIES
Discharge: HOME | End: 2024-09-05
Attending: PODIATRIST
Payer: COMMERCIAL

## 2024-09-05 PROCEDURE — 97110 THERAPEUTIC EXERCISES: CPT

## 2024-09-05 NOTE — FLOWSHEET NOTE
[x] [facility]  Outpatient Rehabilitation &  Therapy  [address]  P: [phone]  F: [phone]     Physical Therapy Daily Treatment Note    Date:  2024  Patient Name:  Kellen Vallejo    :  1985  MRN: [id number]  Physician: Karina Woods                                Insurance: [facility] (60 per benefit period- resets in April - used  in )  Medical Diagnosis: Stress Incontinence N93.3                     Rehab Codes: N39.3, M62.50  Onset Date: 24                                  Next 's appt: PRN  Visit# / total visits:      Cancels/No Shows: 0/0    Subjective:    Pain:  [] Yes  [x] No Location: low back  Pain Rating: (0-10 scale) 0/10  Pain altered Tx:  [] No  [] Yes  Action:  Comments: Pt reports she feels good going on hold after today's appt. [place] some discomfort after last appt, back popped during completing bridges and then back has felt better since. Urge deference is about the same. Has been able to correct pelvic alignment at home prn.     Objective:  Modalities:   Precautions: cannot tolerate mat ex d/t inc pain; cannot tolerate massage gun  Exercises:  Exercise Reps/ Time Weight/ Level Comments    MET/shotgun 10 x 5\"  Innominate/pubic x   Supine PRC ex 20x      Piriformis stretches 3 x 30\"  bilateral    Stool hip flexor S 3x30\"      SB lumbar roll outs 10x10\"      Piriformis SB roll outs  10x10\"             Bridge x20      SL clamshells  x20      SL hip abduction x20             Seated PF quick flicks x20      Seated adductor/PF  3\"x10      PF STS 15x   x   PF step ups  10x ea 6\"     PF mini squats  x10   x   PF lateral step outs X10 ea   x   PF mini jumps 4x3   x          3-way hip  2x15 plum  x   Hip circles x10 ea plum  x   Monster walks  3 laps plum  x   Sumo squat  x10 10# One weight x   Deadlift 2x10 10#  x   Split squat x8 ea      Step ups X10 ea Sm stroop 10# weights    CC chops 2x10 25#  x   Palloff press  2x10 25#  --   Palloff

## 2024-09-13 ENCOUNTER — CLINICAL DOCUMENTATION (OUTPATIENT)
Dept: PHYSICAL THERAPY | Facility: CLINIC | Age: 39
End: 2024-09-13

## 2024-09-30 ENCOUNTER — OFFICE VISIT (OUTPATIENT)
Age: 39
End: 2024-09-30
Payer: COMMERCIAL

## 2024-09-30 ENCOUNTER — TELEPHONE (OUTPATIENT)
Age: 39
End: 2024-09-30

## 2024-09-30 VITALS
HEIGHT: 64 IN | DIASTOLIC BLOOD PRESSURE: 77 MMHG | RESPIRATION RATE: 16 BRPM | BODY MASS INDEX: 43.36 KG/M2 | WEIGHT: 254 LBS | OXYGEN SATURATION: 97 % | HEART RATE: 84 BPM | TEMPERATURE: 98 F | SYSTOLIC BLOOD PRESSURE: 108 MMHG

## 2024-09-30 DIAGNOSIS — Z00.00 WELL WOMAN EXAM WITHOUT GYNECOLOGICAL EXAM: Primary | ICD-10-CM

## 2024-09-30 DIAGNOSIS — J01.90 ACUTE BACTERIAL SINUSITIS: ICD-10-CM

## 2024-09-30 DIAGNOSIS — B96.89 ACUTE BACTERIAL SINUSITIS: ICD-10-CM

## 2024-09-30 DIAGNOSIS — J06.9 VIRAL URI: Primary | ICD-10-CM

## 2024-09-30 PROCEDURE — 99213 OFFICE O/P EST LOW 20 MIN: CPT

## 2024-09-30 PROCEDURE — 99212 OFFICE O/P EST SF 10 MIN: CPT

## 2024-09-30 RX ORDER — ALBUTEROL SULFATE 90 UG/1
2 INHALANT RESPIRATORY (INHALATION) 4 TIMES DAILY PRN
Qty: 18 G | Refills: 0 | Status: SHIPPED | OUTPATIENT
Start: 2024-09-30

## 2024-09-30 RX ORDER — DOXYCYCLINE HYCLATE 100 MG
100 TABLET ORAL 2 TIMES DAILY
Qty: 20 TABLET | Refills: 0 | Status: SHIPPED | OUTPATIENT
Start: 2024-09-30 | End: 2024-10-10

## 2024-09-30 SDOH — ECONOMIC STABILITY: FOOD INSECURITY: WITHIN THE PAST 12 MONTHS, THE FOOD YOU BOUGHT JUST DIDN'T LAST AND YOU DIDN'T HAVE MONEY TO GET MORE.: NEVER TRUE

## 2024-09-30 SDOH — ECONOMIC STABILITY: FOOD INSECURITY: WITHIN THE PAST 12 MONTHS, YOU WORRIED THAT YOUR FOOD WOULD RUN OUT BEFORE YOU GOT MONEY TO BUY MORE.: NEVER TRUE

## 2024-09-30 SDOH — ECONOMIC STABILITY: INCOME INSECURITY: HOW HARD IS IT FOR YOU TO PAY FOR THE VERY BASICS LIKE FOOD, HOUSING, MEDICAL CARE, AND HEATING?: NOT HARD AT ALL

## 2024-09-30 ASSESSMENT — ENCOUNTER SYMPTOMS
WHEEZING: 1
SHORTNESS OF BREATH: 0
COUGH: 1
NAUSEA: 0
SORE THROAT: 0
SINUS PRESSURE: 1
TROUBLE SWALLOWING: 0
VOICE CHANGE: 0
CHEST TIGHTNESS: 0
CONSTIPATION: 0
ABDOMINAL PAIN: 0
SINUS PAIN: 1
DIARRHEA: 0
BLOOD IN STOOL: 0
VOMITING: 0

## 2024-09-30 NOTE — PATIENT INSTRUCTIONS
Follow up in 2 weeks for cough   Recommend stopping the sudafed  Continue delsym and mucinex   Recommend taking a hot shower using nasal irrigation followed by Flonase.  Do this twice a day.  Will prescribe doxycycline 100mg twice a day for 10 days.  Will prescribe albuterol 2 puffs 4 times a day.  Continue with loratadine and pepcid  Will schedule annual physical with Dr Avina   Please call the office if you have any questions or concerns  If you have not signed up for [facility] please sign up, you can reach out to me, see your lab results and request refills for medications    Appointments can be made via [facility] as well, with the standard coming 15 minutes prior to appointments  Thank you for your visit today!

## 2024-09-30 NOTE — PROGRESS NOTES
Appearance: Normal appearance. She is obese. She is not ill-appearing, toxic-appearing or diaphoretic.   HENT:      Head: Normocephalic and atraumatic.      Right Ear: Hearing, ear canal and external ear normal. A middle ear effusion is present. There is no impacted cerumen. Tympanic membrane is not perforated, erythematous, retracted or bulging.      Left Ear: Hearing, ear canal and external ear normal. A middle ear effusion is present. There is no impacted cerumen. Tympanic membrane is not perforated, erythematous, retracted or bulging.      Nose: Congestion present. No rhinorrhea.      Right Sinus: Maxillary sinus tenderness present. No frontal sinus tenderness.      Left Sinus: Maxillary sinus tenderness present. No frontal sinus tenderness.      Mouth/Throat:      Mouth: Mucous membranes are moist.      Pharynx: No oropharyngeal exudate or posterior oropharyngeal erythema.   Eyes:      General: No scleral icterus.     Extraocular Movements: Extraocular movements intact.      Conjunctiva/sclera: Conjunctivae normal.   Cardiovascular:      Rate and Rhythm: Normal rate and regular rhythm.      Heart sounds: No murmur heard.     No friction rub. No gallop.   Pulmonary:      Breath sounds: Decreased air movement present. No wheezing, rhonchi or rales.   Musculoskeletal:         General: No tenderness.      Right lower leg: No edema.      Left lower leg: No edema.   Lymphadenopathy:      Cervical: No cervical adenopathy.   Skin:     General: Skin is warm and dry.   Neurological:      General: No focal deficit present.      Mental Status: She is alert and oriented to person, place, and time.   Psychiatric:         Mood and Affect: Mood normal.         Behavior: Behavior normal.         Thought Content: Thought content normal.         ASSESSMENT/PLAN     1. Viral URI  -     albuterol sulfate HFA (VENTOLIN HFA) 108 (90 Base) MCG/ACT inhaler; Inhale 2 puffs into the lungs 4 times daily as needed for Wheezing, Disp-18 g,

## 2024-09-30 NOTE — TELEPHONE ENCOUNTER
Please place orders for labs, will be coming in for her PE with you in November...will have form for work...  Will get them done day of appt...  Thanks

## 2024-10-07 ENCOUNTER — HOSPITAL ENCOUNTER (OUTPATIENT)
Age: 39
Discharge: HOME | End: 2024-10-09
Payer: COMMERCIAL

## 2024-10-07 ENCOUNTER — OFFICE VISIT (OUTPATIENT)
Age: 39
End: 2024-10-07
Payer: COMMERCIAL

## 2024-10-07 ENCOUNTER — HOSPITAL ENCOUNTER (OUTPATIENT)
Dept: GENERAL RADIOLOGY | Age: 39
Discharge: HOME | End: 2024-10-09
Payer: COMMERCIAL

## 2024-10-07 VITALS
DIASTOLIC BLOOD PRESSURE: 73 MMHG | HEART RATE: 79 BPM | TEMPERATURE: 97.9 F | BODY MASS INDEX: 43.77 KG/M2 | OXYGEN SATURATION: 99 % | WEIGHT: 256.4 LBS | SYSTOLIC BLOOD PRESSURE: 123 MMHG | HEIGHT: 64 IN

## 2024-10-07 DIAGNOSIS — R05.2 SUBACUTE COUGH: Primary | ICD-10-CM

## 2024-10-07 DIAGNOSIS — Z78.9 NONSMOKER: ICD-10-CM

## 2024-10-07 DIAGNOSIS — R05.2 SUBACUTE COUGH: ICD-10-CM

## 2024-10-07 PROCEDURE — 71046 X-RAY EXAM CHEST 2 VIEWS: CPT

## 2024-10-07 PROCEDURE — 94760 N-INVAS EAR/PLS OXIMETRY 1: CPT | Performed by: FAMILY MEDICINE

## 2024-10-07 PROCEDURE — 99213 OFFICE O/P EST LOW 20 MIN: CPT | Performed by: FAMILY MEDICINE

## 2024-10-07 PROCEDURE — 99214 OFFICE O/P EST MOD 30 MIN: CPT | Performed by: FAMILY MEDICINE

## 2024-10-07 ASSESSMENT — ENCOUNTER SYMPTOMS
WHEEZING: 1
SHORTNESS OF BREATH: 0
VOMITING: 0
NAUSEA: 0
ABDOMINAL PAIN: 0
COUGH: 1

## 2024-10-07 ASSESSMENT — VISUAL ACUITY: OU: 1

## 2024-10-07 NOTE — PATIENT INSTRUCTIONS
Thank you for your visit today to the [facility]. It was our pleasure to provide the best possible care for you today.    As we discussed, please take note of the following:  - Continue and finish your antibiotic as directed.  - Have your chest X-ray completed as soon as possible.  - Return in about 2 weeks (around 10/21/2024).    Call the office at [phone] with any questions or concerns.

## 2024-10-07 NOTE — PROGRESS NOTES
[facility] Medicine [facility]  [address] [place] [address]  Phone: [phone]  Fax: [phone]      Date of Visit:  10/7/2024  Patient Name: Kellen Vallejo   Patient :  1985     HPI:     Chief Complaint   Patient presents with    right lung pain     Sinus infection is better and she is still has a cough and Saturday pain in the right lung. Dull Stabbing pain per patient. Ache in the back of the lung. Cough is worse.  KP    Flu Vaccine       Kellen Vallejo is a 39 y.o. female who presents today to discuss the above. Patient was seen on 2024 for her symptoms and was diagnosed with viral URI and acute bacterial sinusitis. She was discharged with prescription for doxycycline (4 days left) and albuterol inhaler. Her sinus symptoms are better, but after a temporary improvement in her cough, her cough has worsened again and feels like she has been having pain in her right lung since Saturday. Her cough is nonproductive. The cough is worse in the evening.    Patient has been using her albuterol inhaler BID (morning and night). She thinks it is helping overall, but not reducing her cough.     Patient has been taking Mucinex 2-3x, Delsym, and Flonase BID along with sinus rinses and alternating Tylenol and ibuprofen.    Patient does have a history of childhood asthma but denies having any asthma attacks or hospitalizations related to asthma during adulthood.    I personally reviewed the patient's past medical history, current medications, allergies, surgical history, family history and social history.  Updates were made as necessary.    REVIEW OF SYSTEMS      Review of Systems   Constitutional:  Positive for appetite change (decreased) and chills. Negative for fever and unexpected weight change.   Respiratory:  Positive for cough and wheezing (only after coughing). Negative for shortness of breath.         No hemoptysis   Cardiovascular:  Negative

## 2024-11-08 ENCOUNTER — HOSPITAL ENCOUNTER (OUTPATIENT)
Age: 39
Setting detail: SPECIMEN
Discharge: HOME | End: 2024-11-08

## 2024-11-08 ENCOUNTER — OFFICE VISIT (OUTPATIENT)
Age: 39
End: 2024-11-08
Payer: COMMERCIAL

## 2024-11-08 ENCOUNTER — TELEPHONE (OUTPATIENT)
Age: 39
End: 2024-11-08

## 2024-11-08 VITALS
HEIGHT: 64 IN | WEIGHT: 256 LBS | BODY MASS INDEX: 43.71 KG/M2 | HEART RATE: 73 BPM | TEMPERATURE: 97.9 F | SYSTOLIC BLOOD PRESSURE: 118 MMHG | DIASTOLIC BLOOD PRESSURE: 70 MMHG | RESPIRATION RATE: 16 BRPM

## 2024-11-08 DIAGNOSIS — Z00.00 WELL WOMAN EXAM WITHOUT GYNECOLOGICAL EXAM: ICD-10-CM

## 2024-11-08 DIAGNOSIS — Z00.00 WELL WOMAN EXAM WITHOUT GYNECOLOGICAL EXAM: Primary | ICD-10-CM

## 2024-11-08 DIAGNOSIS — Z78.9 NONSMOKER: ICD-10-CM

## 2024-11-08 LAB
ANION GAP SERPL CALCULATED.3IONS-SCNC: 12 MMOL/L (ref 9–16)
BUN SERPL-MCNC: 16 MG/DL (ref 6–20)
CALCIUM SERPL-MCNC: 9.3 MG/DL (ref 8.6–10.4)
CHLORIDE SERPL-SCNC: 103 MMOL/L (ref 98–107)
CHOLEST SERPL-MCNC: 222 MG/DL (ref 0–199)
CHOLESTEROL/HDL RATIO: 5.4
CO2 SERPL-SCNC: 22 MMOL/L (ref 20–31)
CREAT SERPL-MCNC: 0.7 MG/DL (ref 0.6–0.9)
GFR, ESTIMATED: >90 ML/MIN/1.73M2
GLUCOSE SERPL-MCNC: 94 MG/DL (ref 74–99)
HDLC SERPL-MCNC: 41 MG/DL
LDLC SERPL CALC-MCNC: 154 MG/DL (ref 0–100)
POTASSIUM SERPL-SCNC: 4.7 MMOL/L (ref 3.7–5.3)
SODIUM SERPL-SCNC: 137 MMOL/L (ref 136–145)
TRIGL SERPL-MCNC: 135 MG/DL (ref 0–149)
VLDLC SERPL CALC-MCNC: 27 MG/DL (ref 1–30)

## 2024-11-08 PROCEDURE — 99214 OFFICE O/P EST MOD 30 MIN: CPT

## 2024-11-08 PROCEDURE — 99395 PREV VISIT EST AGE 18-39: CPT | Performed by: FAMILY MEDICINE

## 2024-11-08 ASSESSMENT — PATIENT HEALTH QUESTIONNAIRE - PHQ9
1. LITTLE INTEREST OR PLEASURE IN DOING THINGS: NOT AT ALL
SUM OF ALL RESPONSES TO PHQ QUESTIONS 1-9: 0
SUM OF ALL RESPONSES TO PHQ QUESTIONS 1-9: 0
2. FEELING DOWN, DEPRESSED OR HOPELESS: NOT AT ALL
SUM OF ALL RESPONSES TO PHQ QUESTIONS 1-9: 0
SUM OF ALL RESPONSES TO PHQ QUESTIONS 1-9: 0
SUM OF ALL RESPONSES TO PHQ9 QUESTIONS 1 & 2: 0

## 2024-11-08 NOTE — PROGRESS NOTES
[facility] Family Medicine Res[facility]  [address] [place] [address]  Phone: [phone]  Fax: [phone]      CHIEF COMPLAINT:     Kellen Vallejo is a 39 y.o. female for an annual wellness exam.  Chief Complaint   Patient presents with    Annual Exam     9 weeks still cough was seen in office sinus issues. Inhaler and  delsym to help.        HPI     Subjective: Health goals for the upcoming year (and/or overall health goals): trying to get pregnant. Working on being healthier and increasing activity.     Gynecologic History:  Menarche: 10/11 yo  Menstrual history: Patient's last menstrual period was 10/19/2024 (exact date).    Genetic Qualified Family History of Breast, Ovarian , Colon or Uterine Cancer: Yes mother is brca double negative diagnosed at 58.     Obstetrics Planning:  Planning on pregnancy within next year: yes  On folic acid: educated on importance of 800mcg daily as apart of her prenatal vitamins    Sexual Health:  Gender identity and sexual preference: female,   Sexually active:   Concerns about sexual dysfunction: no  STI exposure/risks: declines.     General Health:  Diet: typical diet high protein fruits and veggies, three meals 1-2 snacks. Limiting unhealthy fat  Exercise: walk, lift weights, doing piliates 4-5 days a week.   Sleep: normally good 7 hours.   Dental health: go on Monday every 4-6 months  Eye health: went in September.   Vaccinations up to date:   Immunization History   Administered Date(s) Administered    COVID-19, MODERNA, (age 12y+), IM, 50mcg/0.5mL 11/28/2023    COVID-19, NOVAVAX, (age 12y+), IM, 5mcg/0.5mL 10/31/2024    COVID-19, PFIZER Bivalent, DO NOT Dilute, (age 12y+), IM, 30 mcg/0.3 mL 10/07/2022    COVID-19, PFIZER PURPLE top, DILUTE for use, (age 12 y+), 30mcg/0.3mL 05/01/2021, 11/03/2021    COVID-19, [place] Vaccine, Vaccine Unspecified 04/13/2021    Influenza Trivalent 10/31/2024    Influenza, FLUARIX, FLULAVAL,

## 2024-11-08 NOTE — PATIENT INSTRUCTIONS
Thank you for following up with us at [facility] outpatient residency clinic! It was a pleasure to meet you today!     Our plan is the following:  - good luck trying to continue creating a larger family! Do your best to see if your folic acid is atleast 800mcg  - As soon as your labs become available I will fill out your work paperwork  - If your cough does not continue to improve give us a call.     If you have any additional questions or concerns, please call the office ([phone]) and speak to one of the staff. They will triage and forward the message to the doctors! Have a great rest of your day!

## 2024-11-08 NOTE — TELEPHONE ENCOUNTER
Left patient a message to call the office back, if patient returns our phone call please let her know that her form has been completed and if she would like to come  the form or have it faxed. Also please make sure she is aware of her recent lab result per Dr. Avina. Thanks. THI

## 2024-11-11 NOTE — TELEPHONE ENCOUNTER
2nd attempt left patient a message to call the office back, if patient returns our phone call please let her know that her form has been completed and if she would like to come  the form or have it faxed. Also please make sure she is aware of her recent lab result per Dr. Avina. Thanks. THI

## 2024-11-12 NOTE — TELEPHONE ENCOUNTER
Send patient Mychart message let her know that her form has been faxed and original mailed to him. Message completed. THI

## 2024-12-09 ENCOUNTER — OFFICE VISIT (OUTPATIENT)
Age: 39
End: 2024-12-09
Payer: COMMERCIAL

## 2024-12-09 VITALS
BODY MASS INDEX: 43.8 KG/M2 | RESPIRATION RATE: 12 BRPM | WEIGHT: 255.2 LBS | DIASTOLIC BLOOD PRESSURE: 77 MMHG | SYSTOLIC BLOOD PRESSURE: 135 MMHG | TEMPERATURE: 99.2 F | HEART RATE: 74 BPM

## 2024-12-09 DIAGNOSIS — B96.89 ACUTE BACTERIAL SINUSITIS: Primary | ICD-10-CM

## 2024-12-09 DIAGNOSIS — H66.005 RECURRENT ACUTE SUPPURATIVE OTITIS MEDIA WITHOUT SPONTANEOUS RUPTURE OF LEFT TYMPANIC MEMBRANE: ICD-10-CM

## 2024-12-09 DIAGNOSIS — J01.90 ACUTE BACTERIAL SINUSITIS: Primary | ICD-10-CM

## 2024-12-09 PROCEDURE — 99213 OFFICE O/P EST LOW 20 MIN: CPT

## 2024-12-09 PROCEDURE — 99212 OFFICE O/P EST SF 10 MIN: CPT

## 2024-12-09 NOTE — PROGRESS NOTES
Substance and Sexual Activity    Alcohol use: No    Drug use: No    Sexual activity: Yes     Partners: Male     Birth control/protection: Condom     Social Determinants of Health     Financial Resource Strain: Low Risk  (9/30/2024)    Overall Financial Resource Strain (CARDIA)     Difficulty of Paying Living Expenses: Not hard at all   Food Insecurity: No Food Insecurity (9/30/2024)    Hunger Vital Sign     Worried About Running Out of Food in the Last Year: Never true     Ran Out of Food in the Last Year: Never true   Transportation Needs: No Transportation Needs (9/30/2024)    PRAPARE - Transportation     Lack of Transportation (Medical): No     Lack of Transportation (Non-Medical): No   Physical Activity: Sufficiently Active (9/22/2023)    Exercise Vital Sign     Days of Exercise per Week: 5 days     Minutes of Exercise per Session: 40 min   Stress: No Stress Concern Present (9/22/2023)    [employer] - Occupational Stress Questionnaire     Feeling of Stress : Not at all   Social Connections: Socially Integrated (9/22/2023)    Social Connection and Isolation Panel [NHANES]     Frequency of Communication with Friends and Family: Three times a week     Frequency of Social Gatherings with Friends and Family: Twice a week     Attends Confucianism Services: More than 4 times per year     Active Member of Clubs or Organizations: Yes     Attends Club or Organization Meetings: More than 4 times per year     Marital Status:    Intimate Partner Violence: Not At Risk (1/17/2024)    Humiliation, Afraid, Rape, and Kick questionnaire     Fear of Current or Ex-Partner: No     Emotionally Abused: No     Physically Abused: No     Sexually Abused: No   Housing Stability: Unknown (9/30/2024)    Housing Stability Vital Sign     Homeless in the Last Year: No        PHYSICAL EXAM   /77   Pulse 74   Temp 99.2 °F (37.3 °C) (Oral)   Resp 12   Wt 115.8 kg (255 lb 3.2 oz)   BMI 43.80 kg/m²

## 2024-12-09 NOTE — PATIENT INSTRUCTIONS
Thank you for following up with us at [facility] outpatient residency clinic! It was a pleasure to meet you today!     Our plan is the following:  - I am giving you Augmentin for both your sinus and your ear pain.     If you have any additional questions or concerns, please call the office ([phone]) and speak to one of the staff. They will triage and forward the message to the doctors! Have a great rest of your day!

## 2024-12-16 ASSESSMENT — ENCOUNTER SYMPTOMS
PHOTOPHOBIA: 0
NAUSEA: 0
VOMITING: 0
SORE THROAT: 0
SHORTNESS OF BREATH: 0
COUGH: 0

## 2025-03-09 SDOH — ECONOMIC STABILITY: FOOD INSECURITY: WITHIN THE PAST 12 MONTHS, THE FOOD YOU BOUGHT JUST DIDN'T LAST AND YOU DIDN'T HAVE MONEY TO GET MORE.: NEVER TRUE

## 2025-03-09 SDOH — ECONOMIC STABILITY: INCOME INSECURITY: IN THE LAST 12 MONTHS, WAS THERE A TIME WHEN YOU WERE NOT ABLE TO PAY THE MORTGAGE OR RENT ON TIME?: NO

## 2025-03-09 SDOH — ECONOMIC STABILITY: FOOD INSECURITY: WITHIN THE PAST 12 MONTHS, YOU WORRIED THAT YOUR FOOD WOULD RUN OUT BEFORE YOU GOT MONEY TO BUY MORE.: NEVER TRUE

## 2025-03-09 ASSESSMENT — PATIENT HEALTH QUESTIONNAIRE - PHQ9
1. LITTLE INTEREST OR PLEASURE IN DOING THINGS: NOT AT ALL
SUM OF ALL RESPONSES TO PHQ QUESTIONS 1-9: 0
2. FEELING DOWN, DEPRESSED OR HOPELESS: NOT AT ALL
SUM OF ALL RESPONSES TO PHQ9 QUESTIONS 1 & 2: 0
SUM OF ALL RESPONSES TO PHQ QUESTIONS 1-9: 0
2. FEELING DOWN, DEPRESSED OR HOPELESS: NOT AT ALL
SUM OF ALL RESPONSES TO PHQ QUESTIONS 1-9: 0
SUM OF ALL RESPONSES TO PHQ QUESTIONS 1-9: 0
1. LITTLE INTEREST OR PLEASURE IN DOING THINGS: NOT AT ALL

## 2025-03-10 ENCOUNTER — PROCEDURE VISIT (OUTPATIENT)
Dept: OBGYN CLINIC | Age: 40
End: 2025-03-10
Payer: COMMERCIAL

## 2025-03-10 ENCOUNTER — OFFICE VISIT (OUTPATIENT)
Dept: OBGYN CLINIC | Age: 40
End: 2025-03-10
Payer: COMMERCIAL

## 2025-03-10 VITALS
WEIGHT: 247.3 LBS | SYSTOLIC BLOOD PRESSURE: 118 MMHG | DIASTOLIC BLOOD PRESSURE: 74 MMHG | HEART RATE: 84 BPM | BODY MASS INDEX: 42.45 KG/M2

## 2025-03-10 DIAGNOSIS — N91.2 AMENORRHEA: ICD-10-CM

## 2025-03-10 DIAGNOSIS — N92.6 MISSED MENSES: Primary | ICD-10-CM

## 2025-03-10 DIAGNOSIS — Z87.59 HISTORY OF DELIVERY OF MACROSOMAL INFANT: ICD-10-CM

## 2025-03-10 DIAGNOSIS — O09.521 MULTIGRAVIDA OF ADVANCED MATERNAL AGE IN FIRST TRIMESTER: ICD-10-CM

## 2025-03-10 DIAGNOSIS — Z32.01 POSITIVE PREGNANCY TEST: ICD-10-CM

## 2025-03-10 PROBLEM — O09.529 HIGH-RISK PREGNANCY, MULTIGRAVIDA OF ADVANCED MATERNAL AGE, ANTEPARTUM: Status: ACTIVE | Noted: 2025-03-10

## 2025-03-10 PROCEDURE — 99214 OFFICE O/P EST MOD 30 MIN: CPT | Performed by: ADVANCED PRACTICE MIDWIFE

## 2025-03-10 PROCEDURE — 76801 OB US < 14 WKS SINGLE FETUS: CPT | Performed by: OBSTETRICS & GYNECOLOGY

## 2025-03-10 NOTE — PROGRESS NOTES
SUBJECTIVE:    Chief Complaint:  Chief Complaint   Patient presents with    Amenorrhea     HPI:  Kellen is a 40 y.o. female being seen today for missed menses.  She reports a positive pregnancy test 25.  This is a planned pregnancy. She is  accepting at this time.  LMP: Patient's last menstrual period was 2025.  Sure and definite: Yes. Regular monthly cycle: Yes    She was on a contraceptive at the time of conception.    Estimated weeks gestation today: 8w5d  Tentative VAN: 10/15/2025     Relationship with FOB: involved     Having SI joint pain, plans to return to PCP for this issue.   Weaned of Lexapro last year, feels she is doing okay.   Is a dietician.       OBJECTIVE:    /74   Pulse 84   Wt 112.2 kg (247 lb 4.8 oz)   LMP 2025  Body mass index is 42.45 kg/m².     Mother's ethnicity:    Father's ethnicity:      She is complaining today of:   Pain: No  Cramping: No  Bleeding or spotting: No    Nausea: Yes  Vomiting: No    Breast enlargement/tenderness: Yes  Fatigue: Yes  Frequency of urination: Yes    Previous high risk obstetrical history: none   OB History    Para Term  AB Living   2 2 2   2   SAB IAB Ectopic Molar Multiple Live Births        2      # Outcome Date GA Lbr Leon/2nd Weight Sex Type Anes PTL Lv   2 Term 18 40w2d 02:52 / 00:06 4.03 kg (8 lb 14.2 oz) M Vag-Spont None N JESUS      Birth Comments: [facility]: [phone]   1 Term  40w0d  4.338 kg (9 lb 9 oz) F Vag-Spont   JESUS      Birth Comments: IOL 2/2 H/A x 3 weeks (per notes)       ROS was done and is negative except as documented in HPI.  History was reviewed as documented on Epic Navigator.    ASSESSMENT/PLAN:  Missed menses with + UCG  Prenatal vitamins were ordered: No taking OTC  Discussed self-help for nausea, avoiding OTC medications   Ultrasound for dating and viability was ordered and instructed to complete before OB Hx visit: Yes   Initial information on genetic testing was given:

## 2025-03-10 NOTE — PROGRESS NOTES
LMP: 1/8/25    GA by LMP: 8w5d  VAN by LMP: 10/15/25    GA by U/S: 8w4d  VAN by U/S: 10/16/25  HR: 175 bpm    RT. OVARY: 2.18 x 2.14 x 1.64 cm  Unremarkable  LT. OVARY: 1.99 x 1.40 x 1.70 cm  Corpus luteum: 1.18 x 1.27 x 1.45 cm    No free fluid

## 2025-03-26 NOTE — PROGRESS NOTES
New Obstetric Intake     Patient Name:Kellen Vallejo  Patient Age: 40 y.o.  Date of Visit: 3/27/2025  Patient's estimated gestational age at visit: 11w1d  Estimated Date of Delivery: 10/15/25    SUBJECTIVE:     Any Concerns Today: yes- Patient had some general pregnancy questions and information and education given. Patient had a medication question and writer consulted GEENA Collins and information given to pt. Patient declined all genetic screening and consult.    OB History          3    Para   2    Term   2            AB        Living   2         SAB        IAB        Ectopic        Molar        Multiple        Live Births   2          Obstetric Comments   [id number]               Bryant Score:   Postpartum Depression: Low Risk  (3/27/2025)    Bryant  Depression Scale     Last EPDS Total Score: 3     Last EPDS Self Harm Result: Never      History of postpartum depression: no    Generalized Anxiety Screening:      3/27/2025     2:00 PM 2024     2:04 PM 2024     1:00 PM 2023     4:17 PM   CORNELIO 7 SCORE   CORNELIO-7 Total Score 1 0 2 1     Interpretation of CORNELIO-7 score: 5-9 = mild anxiety, 10-14 = moderate anxiety, 15+ = severe anxiety. Recommend referral to behavioral health for scores 10 or greater.     Social Determinants of Health:   Financial Resource Strain: Low Risk  (3/27/2025)    Overall Financial Resource Strain (CARDIA)     Difficulty of Paying Living Expenses: Not hard at all      Food Insecurity: No Food Insecurity (3/27/2025)    Hunger Vital Sign     Worried About Running Out of Food in the Last Year: Never true     Ran Out of Food in the Last Year: Never true      Transportation Needs: No Transportation Needs (3/27/2025)    PRAPARE - Transportation     Lack of Transportation (Medical): No     Lack of Transportation (Non-Medical): No      Intimate Partner Violence: Not At Risk (3/27/2025)    Humiliation, Afraid, Rape, and Kick questionnaire     Fear of Current

## 2025-03-27 ENCOUNTER — HOSPITAL ENCOUNTER (OUTPATIENT)
Age: 40
Setting detail: SPECIMEN
Discharge: HOME | End: 2025-03-27

## 2025-03-27 ENCOUNTER — INITIAL PRENATAL (OUTPATIENT)
Dept: OBGYN CLINIC | Age: 40
End: 2025-03-27
Payer: COMMERCIAL

## 2025-03-27 VITALS
WEIGHT: 245.6 LBS | HEART RATE: 88 BPM | SYSTOLIC BLOOD PRESSURE: 124 MMHG | BODY MASS INDEX: 42.16 KG/M2 | DIASTOLIC BLOOD PRESSURE: 70 MMHG

## 2025-03-27 DIAGNOSIS — Z87.59 HISTORY OF DELIVERY OF MACROSOMAL INFANT: ICD-10-CM

## 2025-03-27 DIAGNOSIS — O09.529 HIGH-RISK PREGNANCY, MULTIGRAVIDA OF ADVANCED MATERNAL AGE, ANTEPARTUM: Primary | ICD-10-CM

## 2025-03-27 DIAGNOSIS — O09.529 HIGH-RISK PREGNANCY, MULTIGRAVIDA OF ADVANCED MATERNAL AGE, ANTEPARTUM: ICD-10-CM

## 2025-03-27 LAB
25(OH)D3 SERPL-MCNC: 36.6 NG/ML (ref 30–100)
ABO + RH BLD: NORMAL
AMOUNT GLUCOSE GIVEN: NORMAL G
BASOPHILS # BLD: 0.03 K/UL (ref 0–0.2)
BASOPHILS NFR BLD: 0 % (ref 0–2)
BLOOD GROUP ANTIBODIES SERPL: NEGATIVE
EOSINOPHIL # BLD: 0.09 K/UL (ref 0–0.44)
EOSINOPHILS RELATIVE PERCENT: 1 % (ref 1–4)
ERYTHROCYTE [DISTWIDTH] IN BLOOD BY AUTOMATED COUNT: 13 % (ref 11.8–14.4)
GLUCOSE 3H P 100 G GLC PO SERPL-MCNC: 103 MG/DL
HBV SURFACE AG SERPL QL IA: NONREACTIVE
HCT VFR BLD AUTO: 41.1 % (ref 36.3–47.1)
HCV AB SERPL QL IA: NONREACTIVE
HGB BLD-MCNC: 13.4 G/DL (ref 11.9–15.1)
IMM GRANULOCYTES # BLD AUTO: 0.03 K/UL (ref 0–0.3)
IMM GRANULOCYTES NFR BLD: 0 %
LYMPHOCYTES NFR BLD: 2.25 K/UL (ref 1.1–3.7)
LYMPHOCYTES RELATIVE PERCENT: 24 % (ref 24–43)
MCH RBC QN AUTO: 29.1 PG (ref 25.2–33.5)
MCHC RBC AUTO-ENTMCNC: 32.6 G/DL (ref 28.4–34.8)
MCV RBC AUTO: 89.3 FL (ref 82.6–102.9)
MONOCYTES NFR BLD: 0.62 K/UL (ref 0.1–1.2)
MONOCYTES NFR BLD: 7 % (ref 3–12)
NEUTROPHILS NFR BLD: 68 % (ref 36–65)
NEUTS SEG NFR BLD: 6.22 K/UL (ref 1.5–8.1)
NRBC BLD-RTO: 0 PER 100 WBC
PLATELET # BLD AUTO: 283 K/UL (ref 138–453)
PMV BLD AUTO: 10.2 FL (ref 8.1–13.5)
RBC # BLD AUTO: 4.6 M/UL (ref 3.95–5.11)
RUBV IGG SERPL QL IA: 139 IU/ML
T PALLIDUM AB SER QL IA: NONREACTIVE
WBC OTHER # BLD: 9.2 K/UL (ref 3.5–11.3)

## 2025-03-27 PROCEDURE — 0500F INITIAL PRENATAL CARE VISIT: CPT | Performed by: ADVANCED PRACTICE MIDWIFE

## 2025-03-27 PROCEDURE — 36415 COLL VENOUS BLD VENIPUNCTURE: CPT | Performed by: ADVANCED PRACTICE MIDWIFE

## 2025-03-27 RX ORDER — TRIAMCINOLONE ACETONIDE 1 MG/G
CREAM TOPICAL
COMMUNITY
Start: 2025-01-08

## 2025-03-27 SDOH — ECONOMIC STABILITY: FOOD INSECURITY: WITHIN THE PAST 12 MONTHS, THE FOOD YOU BOUGHT JUST DIDN'T LAST AND YOU DIDN'T HAVE MONEY TO GET MORE.: NEVER TRUE

## 2025-03-27 SDOH — ECONOMIC STABILITY: INCOME INSECURITY: IN THE LAST 12 MONTHS, WAS THERE A TIME WHEN YOU WERE NOT ABLE TO PAY THE MORTGAGE OR RENT ON TIME?: NO

## 2025-03-27 SDOH — ECONOMIC STABILITY: FOOD INSECURITY: WITHIN THE PAST 12 MONTHS, YOU WORRIED THAT YOUR FOOD WOULD RUN OUT BEFORE YOU GOT MONEY TO BUY MORE.: NEVER TRUE

## 2025-03-27 ASSESSMENT — ANXIETY QUESTIONNAIRES
1. FEELING NERVOUS, ANXIOUS, OR ON EDGE: NOT AT ALL
7. FEELING AFRAID AS IF SOMETHING AWFUL MIGHT HAPPEN: NOT AT ALL
GAD7 TOTAL SCORE: 1
5. BEING SO RESTLESS THAT IT IS HARD TO SIT STILL: NOT AT ALL
2. NOT BEING ABLE TO STOP OR CONTROL WORRYING: NOT AT ALL
6. BECOMING EASILY ANNOYED OR IRRITABLE: NOT AT ALL
IF YOU CHECKED OFF ANY PROBLEMS ON THIS QUESTIONNAIRE, HOW DIFFICULT HAVE THESE PROBLEMS MADE IT FOR YOU TO DO YOUR WORK, TAKE CARE OF THINGS AT HOME, OR GET ALONG WITH OTHER PEOPLE: NOT DIFFICULT AT ALL
3. WORRYING TOO MUCH ABOUT DIFFERENT THINGS: NOT AT ALL
4. TROUBLE RELAXING: SEVERAL DAYS

## 2025-03-27 ASSESSMENT — SOCIAL DETERMINANTS OF HEALTH (SDOH)
WITHIN THE LAST YEAR, HAVE YOU BEEN AFRAID OF YOUR PARTNER OR EX-PARTNER?: NO
WITHIN THE LAST YEAR, HAVE YOU BEEN KICKED, HIT, SLAPPED, OR OTHERWISE PHYSICALLY HURT BY YOUR PARTNER OR EX-PARTNER?: NO
WITHIN THE LAST YEAR, HAVE YOU BEEN HUMILIATED OR EMOTIONALLY ABUSED IN OTHER WAYS BY YOUR PARTNER OR EX-PARTNER?: NO
HOW HARD IS IT FOR YOU TO PAY FOR THE VERY BASICS LIKE FOOD, HOUSING, MEDICAL CARE, AND HEATING?: NOT HARD AT ALL
WITHIN THE LAST YEAR, HAVE TO BEEN RAPED OR FORCED TO HAVE ANY KIND OF SEXUAL ACTIVITY BY YOUR PARTNER OR EX-PARTNER?: NO

## 2025-03-28 ENCOUNTER — RESULTS FOLLOW-UP (OUTPATIENT)
Dept: OBGYN CLINIC | Age: 40
End: 2025-03-28

## 2025-03-28 LAB
CHLAMYDIA DNA UR QL NAA+PROBE: NEGATIVE
HGB ELECTROPHORESIS INTERP: NORMAL
MICROORGANISM SPEC CULT: NORMAL
N GONORRHOEA DNA UR QL NAA+PROBE: NEGATIVE
PATHOLOGIST: NORMAL
SERVICE CMNT-IMP: NORMAL
SPECIMEN DESCRIPTION: NORMAL
SPECIMEN DESCRIPTION: NORMAL
VZV IGG SER QL IA: 3.31

## 2025-04-22 ENCOUNTER — OFFICE VISIT (OUTPATIENT)
Age: 40
End: 2025-04-22
Payer: COMMERCIAL

## 2025-04-22 VITALS
HEART RATE: 89 BPM | TEMPERATURE: 97.8 F | BODY MASS INDEX: 41.83 KG/M2 | WEIGHT: 245 LBS | SYSTOLIC BLOOD PRESSURE: 137 MMHG | DIASTOLIC BLOOD PRESSURE: 76 MMHG | HEIGHT: 64 IN | RESPIRATION RATE: 20 BRPM

## 2025-04-22 DIAGNOSIS — B96.89 ACUTE BACTERIAL SINUSITIS: Primary | ICD-10-CM

## 2025-04-22 DIAGNOSIS — J01.90 ACUTE BACTERIAL SINUSITIS: Primary | ICD-10-CM

## 2025-04-22 PROCEDURE — 99213 OFFICE O/P EST LOW 20 MIN: CPT | Performed by: FAMILY MEDICINE

## 2025-04-22 PROCEDURE — 99212 OFFICE O/P EST SF 10 MIN: CPT | Performed by: FAMILY MEDICINE

## 2025-04-22 RX ORDER — ASPIRIN 81 MG/1
162 TABLET ORAL DAILY
COMMUNITY

## 2025-04-22 ASSESSMENT — VISUAL ACUITY: OU: 1

## 2025-04-22 ASSESSMENT — ENCOUNTER SYMPTOMS
SORE THROAT: 1
RHINORRHEA: 1
SHORTNESS OF BREATH: 0
DIARRHEA: 0
ABDOMINAL PAIN: 0
NAUSEA: 0
RESPIRATORY NEGATIVE: 1
VOMITING: 0
SINUS PRESSURE: 1
GASTROINTESTINAL NEGATIVE: 1
SINUS PAIN: 1

## 2025-04-22 NOTE — PATIENT INSTRUCTIONS
Thank you for your visit today to the [facility]. It was our pleasure to provide the best possible care for you today.    As we discussed, please take note of the following:  -  your medications from the pharmacy and take as directed.  - No follow-ups on file.    Call the office at [phone] with any questions or concerns.

## 2025-04-22 NOTE — PROGRESS NOTES
[facility]  [address] [place] [address]  Phone: [phone]  Fax: [phone]      Date of Visit:  2025  Patient Name: Kellen Vallejo   Patient :  1985     HPI:     Chief Complaint   Patient presents with    Other     14 weeks gestation    Facial Pain     Using claritin, cold packs, flonase, sinus rinses       Kellen Vallejo is a 40 y.o. female who presents today to discuss the above. Patient states that she has been having sinus congestion and pain since 2025, around which time multiple family members were sick. She has been having significant sinus pressure and reports having green-yellow nasal drainage with postnasal drip that is worse in the morning. No fever or chills. She has been doing sinus rinses and OTC medications as listed above with minimal relief. Patient reports experiencing sinus infections in the past and feels like this current episode is similar. No recent travel.    I personally reviewed the patient's past medical history, current medications, allergies, surgical history, family history and social history.  Updates were made as necessary.    REVIEW OF SYSTEMS      Review of Systems   Constitutional: Negative.  Negative for chills and fever.   HENT:  Positive for postnasal drip, rhinorrhea, sinus pressure, sinus pain and sore throat (intermittently). Negative for ear discharge and ear pain.    Respiratory: Negative.  Negative for shortness of breath.    Cardiovascular: Negative.  Negative for chest pain.   Gastrointestinal: Negative.  Negative for abdominal pain, diarrhea, nausea and vomiting.   Genitourinary: Negative.  Negative for difficulty urinating.   Neurological: Negative.    All other systems reviewed and are negative.      REVIEWED INFORMATION      Allergies   Allergen Reactions    Octacosanol Other (See Comments)     Dust mites, and environmental; sneezing, watery eyes    Seasonal

## 2025-04-29 ENCOUNTER — ROUTINE PRENATAL (OUTPATIENT)
Dept: OBGYN CLINIC | Age: 40
End: 2025-04-29

## 2025-04-29 VITALS
WEIGHT: 248.6 LBS | DIASTOLIC BLOOD PRESSURE: 74 MMHG | SYSTOLIC BLOOD PRESSURE: 118 MMHG | BODY MASS INDEX: 42.67 KG/M2 | HEART RATE: 89 BPM

## 2025-04-29 DIAGNOSIS — O09.529 HIGH-RISK PREGNANCY, MULTIGRAVIDA OF ADVANCED MATERNAL AGE, ANTEPARTUM: Primary | ICD-10-CM

## 2025-04-29 DIAGNOSIS — Z3A.15 15 WEEKS GESTATION OF PREGNANCY: ICD-10-CM

## 2025-04-29 DIAGNOSIS — O26.899 RH NEGATIVE STATE IN ANTEPARTUM PERIOD: ICD-10-CM

## 2025-04-29 DIAGNOSIS — Z67.91 RH NEGATIVE STATE IN ANTEPARTUM PERIOD: ICD-10-CM

## 2025-04-29 DIAGNOSIS — Z87.59 HISTORY OF DELIVERY OF MACROSOMAL INFANT: ICD-10-CM

## 2025-04-29 PROCEDURE — 0502F SUBSEQUENT PRENATAL CARE: CPT | Performed by: ADVANCED PRACTICE MIDWIFE

## 2025-04-29 NOTE — PROGRESS NOTES
SUBJECTIVE:  Kellen is here for her return OB visit & Physical Exam.   She denies feeling fetal movement.  She denies vaginal bleeding.  She denies vaginal discharge.  She denies leaking of fluid.  She denies uterine cramping.    Kellen reports she had GI bug that went around the house. Gag reflex has angela very triggered since then. Even after GI bug resolved she had emesis periodically, such as after dinnertime. Continues B6/Unisom. At this point no vomiting in 10 days.     OBJECTIVE:  Blood pressure 118/74, pulse 89, weight 112.8 kg (248 lb 9.6 oz), last menstrual period 2025, not currently breastfeeding.    Pregravid BMI: 42.03   TW.633 kg (3 lb 9.6 oz)    See OB Physical in Epic Navigator - proven pelvis 9#9  Last pap: was normal- 24     O negative    ASSESSMENT/PLAN:  IUP at 15w6d  Kellen will watch for fetal movement.  NT screening was previously declined  NIPT was previously declined  Carrier screening was previously declined  [x] EDC was established.  [x] Labs were reviewed.  [] Single sMAFP was not ordered for the 16-20 wk gestational window. Offer NOV  [] Weight gain guidelines were not reviewed.   Normal: BMI < 25: Gain 25-35 lb  Overweight: BMI 25-30: Gain 15-25 lb  Obese: BMI > 30: Gain 11-20 lb  [x] Reviewed warning signs/contacting CNMs    [x] I have reviewed LPN Initial OB Visit.   S = D    High-risk pregnancy, multigravida of advanced maternal age, antepartum  [x] MFM referral in place for anatomy scan at 18-22 weeks.     Pregravid BMI 42  Passed early glucola (103), aware of GDM screening 24-28 weeks  Taking ASA    History of delivery of macrosomal infant x 2  Passed early glucola  No hx GDM    Rh negative state in antepartum period  Rhogam at 28 weeks      Return in about 4 weeks (around 2025) for OB visit with Midwives.    The patient, Kellen Vallejo, was seen with a total time spent of 32 minutes for the visit on this date of service by the [facility]  The time component

## 2025-04-29 NOTE — PROGRESS NOTES
Pt is here today at her 15.6 weeks pnv OB physical   Pt states fetal movement is n/a  Pt has no concerns    Last pap 1/17/2024 Negative for intraepithelial lesion or malignancy

## 2025-05-09 ENCOUNTER — PROCEDURE VISIT (OUTPATIENT)
Age: 40
End: 2025-05-09

## 2025-05-09 VITALS
WEIGHT: 247 LBS | RESPIRATION RATE: 16 BRPM | HEART RATE: 92 BPM | BODY MASS INDEX: 42.17 KG/M2 | SYSTOLIC BLOOD PRESSURE: 124 MMHG | TEMPERATURE: 97.9 F | HEIGHT: 64 IN | DIASTOLIC BLOOD PRESSURE: 73 MMHG

## 2025-05-09 DIAGNOSIS — M99.06 SOMATIC DYSFUNCTION OF LOWER EXTREMITY: ICD-10-CM

## 2025-05-09 DIAGNOSIS — M99.09 SOMATIC DYSFUNCTION OF ABDOMINAL REGION: ICD-10-CM

## 2025-05-09 DIAGNOSIS — M99.02 SOMATIC DYSFUNCTION OF THORACIC REGION: ICD-10-CM

## 2025-05-09 DIAGNOSIS — M99.01 SOMATIC DYSFUNCTION OF CERVICAL REGION: ICD-10-CM

## 2025-05-09 DIAGNOSIS — M53.3 SI (SACROILIAC) PAIN: Primary | ICD-10-CM

## 2025-05-09 DIAGNOSIS — M99.04 SOMATIC DYSFUNCTION OF SACRAL REGION: ICD-10-CM

## 2025-05-09 DIAGNOSIS — M99.05 SOMATIC DYSFUNCTION OF PELVIS REGION: ICD-10-CM

## 2025-05-09 NOTE — PATIENT INSTRUCTIONS
OMT Patient Instructions:  -Drink plenty of water next 24 to 48 hours.  -Avoid any heavy activity over the next 24-48 hours, but you may resume your activities as tolerated.  Be sure to work on your home exercises and stretching the affected area(s) several times per day.  -Sometimes patients do feel sore after OMT.  They may also experience mild flu like symptoms; drinking plenty of water and/or taking Tylenol/NSAIDs as needed for any pain or discomfort can help.   -It may take more than one OMT appointment to feel better.  Therefore, we may need to see you back for an additional treatment(s).

## 2025-05-09 NOTE — PROGRESS NOTES
[facility]  [address] [place] [address]  Phone: [phone]  Fax: [phone]  Date of Visit:  2025  Patient Name: Kellen Vallejo   Patient :  1985     CHIEF COMPLAINT:     Kellen Vallejo is a 40 y.o. female who presents today for an OMT visit to be evaluated for the following condition(s):  Chief Complaint   Patient presents with    Procedure     OMT Si joint is out rate pain today @ 2-3. [facility]       HISTORY OF PRESENT ILLNESS     Patient presents for OMT evaluation of hip pain which she attributes to her pregnancy.      Patient denies radiation of pain, numbness or tingling in the extremities, F/C, unintentional weight loss, and saddle anesthesia.    Patient desires to proceed with OMT.    I personally reviewed the patient's past medical history, current medications, allergies, surgical history, family history and social history.  Updates were made as necessary.    REVIEW OF SYSTEM      General: Denies feeling poorly, tired, fever, chills  Cardiovascular: Denies chest pain, lower extremity edema, palpitations  Respiratory: Denies cough, shortness of breath at rest  MSK: Pain and tension per HPI  Neuro: Denies dizziness, headache    REVIEWED INFORMATION      Current Outpatient Medications   Medication Sig Dispense Refill    aspirin 81 MG EC tablet Take 2 tablets by mouth daily      triamcinolone (KENALOG) 0.1 % cream Apply topically as needed      aluminum chloride (DRYSOL) 20 % external solution Apply topically as needed (itching) Apply topically nightly.PRN 35 mL 1    Prenatal MV-Min-Fe Fum-FA-DHA (PRENATAL 1 PO) Take by mouth      clobetasol (TEMOVATE) 0.05 % cream Apply 1 each topically as needed      Omega-3 Fatty Acids (FISH OIL) 600 MG CAPS Take 2 capsules by mouth daily      famotidine (PEPCID) 20 MG tablet Take 1 tablet by mouth nightly as needed      fluticasone (FLONASE ALLERGY RELIEF) 50 MCG/ACT nasal

## 2025-05-29 ENCOUNTER — ROUTINE PRENATAL (OUTPATIENT)
Age: 40
End: 2025-05-29

## 2025-05-29 VITALS
SYSTOLIC BLOOD PRESSURE: 119 MMHG | DIASTOLIC BLOOD PRESSURE: 69 MMHG | RESPIRATION RATE: 16 BRPM | HEIGHT: 64 IN | BODY MASS INDEX: 42.83 KG/M2 | WEIGHT: 250.88 LBS | HEART RATE: 94 BPM

## 2025-05-29 DIAGNOSIS — O09.529 HIGH-RISK PREGNANCY, MULTIGRAVIDA OF ADVANCED MATERNAL AGE, ANTEPARTUM: ICD-10-CM

## 2025-05-29 DIAGNOSIS — Z67.91 RH NEGATIVE STATE IN ANTEPARTUM PERIOD: ICD-10-CM

## 2025-05-29 DIAGNOSIS — Z87.59 HISTORY OF DELIVERY OF MACROSOMAL INFANT: ICD-10-CM

## 2025-05-29 DIAGNOSIS — Z34.90 THIRD PREGNANCY: ICD-10-CM

## 2025-05-29 DIAGNOSIS — O26.899 RH NEGATIVE STATE IN ANTEPARTUM PERIOD: ICD-10-CM

## 2025-05-29 DIAGNOSIS — Z3A.20 20 WEEKS GESTATION OF PREGNANCY: Primary | ICD-10-CM

## 2025-05-29 NOTE — PROGRESS NOTES
Please refer to attached ultrasound report for doctor's evaluation of the clinical information obtained by vital signs, ultrasound, and/or non-stress test along with management recommendation.  
testing at 34 weeks gestation.  2.  Growth ultrasounds every 4 weeks thereafter.  3.  Delivery at 39 weeks gestation.  4.  Follow-up would be otherwise as clinically indicated.    The patient is to continue to follow with you in your office for ongoing obstetric care.     PLAN:    As noted above or sooner prn.    Sincerely,        Shahbaz Perez MD    I spent 30 minutes of direct contact time with the patient of which greater than 50% of the time was used to  the patient, discuss complications and problems related to her pregnancy, or coordinating her care in addition to the time spent interpreting the ultrasound. I answered all of her questions to her satisfaction.

## 2025-05-29 NOTE — PROGRESS NOTES
Pt is here today at her 20.5 week pnv   Pt states fetal movement is good   Pt has no concerns. Patient would like to discuss ultrasound from last week.    Pt reports no refills    MFM 5/29/25  MPRESSION:  1. Single intrauterine gestation at 20+ weeks with appropriate growth.  2.  No obvious fetal anomalies are noted.  The fetus is in a vertex presentation.  3.  The amniotic fluid volume is normal and the placenta is anterior.     RECOMMENDATIONS:  Each of the recommendations were discussed with the patient:  1.  Begin weekly antepartum testing at 34 weeks gestation.  2.  Growth ultrasounds every 4 weeks thereafter.  3.  Delivery at 39 weeks gestation.  4.  Follow-up would be otherwise as clinically indicated.

## 2025-06-02 ENCOUNTER — ROUTINE PRENATAL (OUTPATIENT)
Dept: OBGYN CLINIC | Age: 40
End: 2025-06-02

## 2025-06-02 VITALS
DIASTOLIC BLOOD PRESSURE: 70 MMHG | SYSTOLIC BLOOD PRESSURE: 110 MMHG | HEART RATE: 85 BPM | WEIGHT: 249.6 LBS | BODY MASS INDEX: 42.84 KG/M2

## 2025-06-02 DIAGNOSIS — O26.899 RH NEGATIVE STATE IN ANTEPARTUM PERIOD: ICD-10-CM

## 2025-06-02 DIAGNOSIS — Z67.91 RH NEGATIVE STATE IN ANTEPARTUM PERIOD: ICD-10-CM

## 2025-06-02 DIAGNOSIS — Z87.59 HISTORY OF DELIVERY OF MACROSOMAL INFANT: ICD-10-CM

## 2025-06-02 DIAGNOSIS — O09.529 HIGH-RISK PREGNANCY, MULTIGRAVIDA OF ADVANCED MATERNAL AGE, ANTEPARTUM: Primary | ICD-10-CM

## 2025-06-02 DIAGNOSIS — Z3A.20 20 WEEKS GESTATION OF PREGNANCY: ICD-10-CM

## 2025-06-02 PROCEDURE — 0502F SUBSEQUENT PRENATAL CARE: CPT

## 2025-06-02 NOTE — PROGRESS NOTES
SUBJECTIVE:  Kellen is here for her return OB visit.  She reports fetal movement although sporadic at times with anterior placenta.  She denies vaginal bleeding.  She denies vaginal discharge.  She denies leaking of fluid.  She denies uterine contraction activity.  She denies retaining fluid in her extremities.  Kellen reports no concerns today. Still feeling some nausea and is gaggy but has not vomited in a few weeks.      OBJECTIVE:  Blood pressure 110/70, pulse 85, weight 113.2 kg (249 lb 9.6 oz), last menstrual period 2025, not currently breastfeeding.    Pregravid BMI: 42.03   TW.087 kg (4 lb 9.6 oz)    O negative    ASSESSMENT/PLAN:  IUP @ 20w5d  Kellen will begin to monitor fetal movement daily  [] NT screening was previously declined  [] NIPT was previously declined  [] Carrier screening was previously declined  [] AFP declined  [x] Educated about glucola screening between 24-28 weeks.  [] Discussed Tdap vaccine between 27-37 weeks.   [] Labs were not ordered.  [x] Anatomy scan is COMPLETED  [] BMI and appropriate weight gain recommendations were not discussed.   Normal: BMI < 25: Gain 25-35 lb  Overweight: BMI 25-30: Gain 15-25 lb  Obese: BMI > 30: Gain 11-20 lb  S = D    Diagnoses and all orders for this visit:    High-risk pregnancy, multigravida of advanced maternal age, antepartum  Genetics declined  Anatomy scan WNL, growth scan 25    Pregravid BMI 42  Taking ASA  Passed early glucose screening    Rh negative state in antepartum period  Discussed Rhogam at 28 weeks    Return in about 4 weeks (around 2025) for OB visit with midwives w/ GTT.     The patient, Kellen Vallejo, was seen with a total time spent of 35 minutes for the visit on this date of service by the [facility]  The time component involved both face-to-face (counseling and education) and non face-to-face time (care coordination), spent in determining the total time component.      On this date 2025, I have spent

## 2025-06-30 NOTE — PROGRESS NOTES
Pt is here today at her 24.6 week pnv   Pt states fetal movement is good   Pt has no concerns   Any refills?  No   Passed early 1 hour     1 hour today or wait for 28 weeks for Rhogam ? Patient would like to complete the 1 hour today.   EPDS                  Glucose 50g drink given @ 9:55 am              Glucose drink finished @  9:57 am     Was the patient able to tolerate the drink.  Yes or No ?  yes                  Lot# [id number]              Exp-  03/31/2027     1hr Draw @  10:57 am         Pt tolerated procedure well     EBONY LEVY MA         7/1/2025    12:27 PM   Postpartum Depression Scale   I have been able to laugh and see the funny side of things As much as I always could   I have looked forward with enjoyment to things As much as I ever did   I have blamed myself unnecessarily when things went wrong No, never   I have been anxious or worried for no good reason Hardly ever   I have felt scared or panicky for no good reason No, not at all   I haven't been able to cope lately No, I have been coping as well as ever   I have been so unhappy that I have had difficulty sleeping Not at all   I have felt sad or miserable No, not at all   I have been so unhappy that I have been crying No, never   The thought of harming myself has occurred to me Never   Total 1          7/1/2025    12:00 PM 3/27/2025     2:00 PM 5/17/2024     2:04 PM   CORNELIO-7 SCREENING   Feeling nervous, anxious, or on edge Not at all Not at all Not at all   Not being able to stop or control worrying Not at all Not at all Not at all   Worrying too much about different things Not at all Not at all Not at all   Trouble relaxing Not at all Several days Not at all   Being so restless that it is hard to sit still Not at all Not at all Not at all   Becoming easily annoyed or irritable Not at all Not at all Not at all   Feeling afraid as if something awful might happen Not at all Not at all Not at all   CORNELIO-7 Total Score 0 1 0   How difficult have

## 2025-07-01 ENCOUNTER — HOSPITAL ENCOUNTER (OUTPATIENT)
Age: 40
Setting detail: SPECIMEN
Discharge: HOME | End: 2025-07-01

## 2025-07-01 ENCOUNTER — ROUTINE PRENATAL (OUTPATIENT)
Dept: OBGYN CLINIC | Age: 40
End: 2025-07-01
Payer: COMMERCIAL

## 2025-07-01 VITALS
SYSTOLIC BLOOD PRESSURE: 116 MMHG | HEART RATE: 100 BPM | WEIGHT: 251.2 LBS | DIASTOLIC BLOOD PRESSURE: 74 MMHG | BODY MASS INDEX: 43.12 KG/M2

## 2025-07-01 DIAGNOSIS — Z3A.24 24 WEEKS GESTATION OF PREGNANCY: ICD-10-CM

## 2025-07-01 DIAGNOSIS — Z3A.24 24 WEEKS GESTATION OF PREGNANCY: Primary | ICD-10-CM

## 2025-07-01 DIAGNOSIS — O09.529 HIGH-RISK PREGNANCY, MULTIGRAVIDA OF ADVANCED MATERNAL AGE, ANTEPARTUM: ICD-10-CM

## 2025-07-01 PROBLEM — Z34.90 THIRD PREGNANCY: Status: RESOLVED | Noted: 2025-05-29 | Resolved: 2025-07-01

## 2025-07-01 PROBLEM — Z3A.20 20 WEEKS GESTATION OF PREGNANCY: Status: RESOLVED | Noted: 2025-05-29 | Resolved: 2025-07-01

## 2025-07-01 LAB
BASOPHILS # BLD: 0.04 K/UL (ref 0–0.2)
BASOPHILS NFR BLD: 0 % (ref 0–2)
EOSINOPHIL # BLD: 0.1 K/UL (ref 0–0.44)
EOSINOPHILS RELATIVE PERCENT: 1 % (ref 1–4)
ERYTHROCYTE [DISTWIDTH] IN BLOOD BY AUTOMATED COUNT: 14.1 % (ref 11.8–14.4)
GLUCOSE 3H P 100 G GLC PO SERPL-MCNC: 118 MG/DL
HCT VFR BLD AUTO: 38.6 % (ref 36.3–47.1)
HGB BLD-MCNC: 12.3 G/DL (ref 11.9–15.1)
HIV 1+2 AB+HIV1 P24 AG SERPL QL IA: NONREACTIVE
IMM GRANULOCYTES # BLD AUTO: 0.12 K/UL (ref 0–0.3)
IMM GRANULOCYTES NFR BLD: 1 %
LYMPHOCYTES NFR BLD: 2.11 K/UL (ref 1.1–3.7)
LYMPHOCYTES RELATIVE PERCENT: 21 % (ref 24–43)
MCH RBC QN AUTO: 30.6 PG (ref 25.2–33.5)
MCHC RBC AUTO-ENTMCNC: 31.9 G/DL (ref 28.4–34.8)
MCV RBC AUTO: 96 FL (ref 82.6–102.9)
MONOCYTES NFR BLD: 0.56 K/UL (ref 0.1–1.2)
MONOCYTES NFR BLD: 6 % (ref 3–12)
NEUTROPHILS NFR BLD: 71 % (ref 36–65)
NEUTS SEG NFR BLD: 7.14 K/UL (ref 1.5–8.1)
NRBC BLD-RTO: 0 PER 100 WBC
PLATELET # BLD AUTO: 280 K/UL (ref 138–453)
PMV BLD AUTO: 10.2 FL (ref 8.1–13.5)
RBC # BLD AUTO: 4.02 M/UL (ref 3.95–5.11)
T PALLIDUM AB SER QL IA: NONREACTIVE
WBC OTHER # BLD: 10.1 K/UL (ref 3.5–11.3)

## 2025-07-01 PROCEDURE — 36415 COLL VENOUS BLD VENIPUNCTURE: CPT | Performed by: ADVANCED PRACTICE MIDWIFE

## 2025-07-01 PROCEDURE — 0502F SUBSEQUENT PRENATAL CARE: CPT | Performed by: ADVANCED PRACTICE MIDWIFE

## 2025-07-01 NOTE — PROGRESS NOTES
[facility]  [address]   [address]  [place] [id number]  Dept: [phone]      Patient Name: Kellen Vallejo  Patient : 1985  MRN #: [id number]  [facility] #: [id number]    Date: 2025    Chief Complaint   Patient presents with    Routine Prenatal Visit     Patient's last menstrual period was 2025.    SUBJECTIVE:    Kellen is  here for her return OB visit.  She is 24w6d weeks pregnant.   She reports  feeling fetal movement.  She denies vaginal bleeding, vaginal discharge, leaking of fluid.  She denies uterine cramping.  She reports  nausea and/or vomiting. Mostly from acid reflux. Tums/ pepcid helps  She denies HA, visual changes, edema, or RUQ pain.     OBJECTIVE:  /74   Pulse 100   Wt 113.9 kg (251 lb 3.2 oz)   LMP 2025   BMI 43.12 kg/m²   Body mass index is 43.12 kg/m².  Total weight gain: 2.812 kg (6 lb 3.2 oz)    Fundal height- 26  FHR- 141      ASSESSMENT/PLAN:  IUP 24w6d weeks    Pregnancy  Due date is based on LMP and confirmed with early dating ultrasound  Patient's prenatal labs are completed.  Blood Type/Rh: O neg  Antibody Screen: negative  Hemoglobin, Hematocrit, Platelets: Hgb 13.4/Hct 41.1/Plt 283  Rubella: immune  T. Pallidum, IgG: non-reactive  Hepatitis B Surface Antigen: non-reactive   Hepatitis C Antibody: non-reactive   HIV:not done  Varicella: positive  Vitamin D: 36.6  Electrophoresis: normal  Sickle Cell Screen: not done  Cystic Fibrosis Screen: not done  Gonorrhea: negative  Chlamydia: negative  Urine culture: negative,   Urine drug screen: not done,   Early 1 hour Glucose Tolerance Test: 103  First Trimester Screen: patient declined  Non-Invasive Prenatal Testing: patient declined  MSAFP/Multiple Markers: patient declined    Anatomy US: anterior placenta, normal cord insertion, low lying placenta/ placenta previa noted,  WNL anatomy        Orders

## 2025-07-02 ENCOUNTER — RESULTS FOLLOW-UP (OUTPATIENT)
Dept: OBGYN CLINIC | Age: 40
End: 2025-07-02

## 2025-07-07 ENCOUNTER — OFFICE VISIT (OUTPATIENT)
Age: 40
End: 2025-07-07
Payer: COMMERCIAL

## 2025-07-07 VITALS
BODY MASS INDEX: 42.47 KG/M2 | SYSTOLIC BLOOD PRESSURE: 112 MMHG | DIASTOLIC BLOOD PRESSURE: 70 MMHG | HEART RATE: 102 BPM | TEMPERATURE: 98.2 F | HEIGHT: 64 IN | RESPIRATION RATE: 14 BRPM | WEIGHT: 248.8 LBS

## 2025-07-07 DIAGNOSIS — J01.01 ACUTE RECURRENT MAXILLARY SINUSITIS: Primary | ICD-10-CM

## 2025-07-07 PROCEDURE — 99213 OFFICE O/P EST LOW 20 MIN: CPT

## 2025-07-07 ASSESSMENT — PATIENT HEALTH QUESTIONNAIRE - PHQ9
SUM OF ALL RESPONSES TO PHQ QUESTIONS 1-9: 0
1. LITTLE INTEREST OR PLEASURE IN DOING THINGS: NOT AT ALL
SUM OF ALL RESPONSES TO PHQ QUESTIONS 1-9: 0
2. FEELING DOWN, DEPRESSED OR HOPELESS: NOT AT ALL

## 2025-07-07 NOTE — PATIENT INSTRUCTIONS
Thank you for following up with us at [facility] outpatient residency clinic! It was a pleasure to meet you today!     Today we discussed the below as next steps in your care:  Continue with the methods that you have been doing to manage your sinusitis which include antihistamines and saline rinses  Your condition was discussed with Dr. Avina who will be seeing you for OMT on Wednesday.  He will be able to do both back adjustment as well as effleurage for acute sinusitis  If your symptoms do not improve after these treatments please call us back and they can discuss antibiotic therapy      Manage your symptoms in the following ways:  Hydration: Continuous oral hydration with water or low sugar beverages such as Gatorade Zero, Powerade Zero, or adult pedialyte  Fevers and pain: Tylenol (Acetaminophen): Tylenol is really good at relieving fever and pain but not inflammation.  If you take Tylenol make sure you note that you can take no more than 4000 mg (4 g) a day.  If for example, the packaging says 250 mg then that means each pill has 250 mg in it.  Divide 4000 mg x 250 mg and that tells you that you can take no more than 16 capsules the entire day in order to prevent damage to your liver.  Headaches, ear pain, muscle and joint pains and fever: Nonsteroidal antiinflammatory drugs (NSAIDs) such as Motrin (Ibuprofen), Aleve (naproxen) and Advil (ibuprofen) are good at relieving headache, ear pain, muscle and joint pains, malaise, fever.  Be mindful of the fact that NSAIDs can also contribute to high blood pressure so be careful if you have high blood pressure.  NSAIDs are also of concern in people with chronic kidney disease.     Runny nose and sneezing and cough that is due to postnasal drip: There are several types of antihistamines they work in different ways to treat the symptoms.  Benadryl can relieve runny nose and sneezing  Claritin and Zyrtec are useful for environmental

## 2025-07-07 NOTE — PROGRESS NOTES
[facility]  [address] [place] [address]  Phone: [phone]  Fax: [phone]      Date of Visit: 2025   Patient Name: Kellen Vallejo   Patient :  1985     ASSESSMENT/PLAN   1. Acute recurrent maxillary sinusitis  40-year-old female at 25 weeks gestation presents today for acute recurrent sinusitis symptoms.  At this time I do not believe that it is bacterial in nature and have directed the patient to continue with her over-the-counter methods which include saline rinses and antihistamine medication.  She has an appointment for OMT on Wednesday at which time I recommended that she also get effleurage for her acute sinusitis.  Discussed with Dr. Avina who is agree that he would be able to do this in addition to the back adjustments that he provides for her pregnancy.      See communications section to see instructions provided to patient  Return if symptoms worsen or fail to improve.    Patient was discussed with Mira Freeman MD.  Electronically signed by Ina Strauss MD on 2025 at 9:18 AM  HPI    Kellen Vallejo is a 40 y.o. female,  has a past medical history of Asthma, Closed fracture of third metatarsal bone of left foot, Migraines, and Nipple discharge. and presents today to discuss Sinus Problem (Had ATB in April  - has allergies - got better now it is back --  yellow/green/brown nasal drainage  -- Patient is 25 Weeks Pregnant  ---gdo)       INTERVAL HISTORY  Patient was last seen on 2025 for acute bacterial sinusitis.  At that time the patient noted having congestion and pain since February around which time multiple members in her family were sick.  She was experiencing significant sinus pressure with green-yellow nasal discharge and a postnasal drip that was worse in the morning.  Denies any fevers or chills.  She has been doing sinus rinses and over-the-counter medications however they were

## 2025-07-09 ENCOUNTER — PROCEDURE VISIT (OUTPATIENT)
Age: 40
End: 2025-07-09
Payer: COMMERCIAL

## 2025-07-09 VITALS
WEIGHT: 247.8 LBS | BODY MASS INDEX: 42.3 KG/M2 | HEIGHT: 64 IN | TEMPERATURE: 97.6 F | DIASTOLIC BLOOD PRESSURE: 62 MMHG | SYSTOLIC BLOOD PRESSURE: 122 MMHG | HEART RATE: 105 BPM | RESPIRATION RATE: 18 BRPM

## 2025-07-09 DIAGNOSIS — M99.04 SOMATIC DYSFUNCTION OF SACRAL REGION: ICD-10-CM

## 2025-07-09 DIAGNOSIS — M99.02 SOMATIC DYSFUNCTION OF THORACIC REGION: ICD-10-CM

## 2025-07-09 DIAGNOSIS — M99.05 SOMATIC DYSFUNCTION OF PELVIS REGION: ICD-10-CM

## 2025-07-09 DIAGNOSIS — M99.00 SOMATIC DYSFUNCTION OF HEAD REGION: ICD-10-CM

## 2025-07-09 DIAGNOSIS — M99.08 SOMATIC DYSFUNCTION OF RIB CAGE REGION: ICD-10-CM

## 2025-07-09 DIAGNOSIS — M53.3 SI (SACROILIAC) PAIN: Primary | ICD-10-CM

## 2025-07-09 DIAGNOSIS — M99.06 SOMATIC DYSFUNCTION OF LOWER EXTREMITY: ICD-10-CM

## 2025-07-09 PROCEDURE — 98927 OSTEOPATH MANJ 5-6 REGIONS: CPT

## 2025-07-09 PROCEDURE — 99213 OFFICE O/P EST LOW 20 MIN: CPT

## 2025-07-09 NOTE — PROGRESS NOTES
and effort  MSK: no involuntary movements  Psych: alert and oriented, appropriately conversational    OMT: Procedure  Somatic Dysfunction Area  Exam Findings Techniques Results   M99.80 (Head) Slnus pressure MFR Improved   M99.01 (Neck)      M99.02 (Thoracic) Trapezius hypertonicity.  ME, MFR, and ART Improved   M99.03 (Lumbar)      M99.04 (Sacrum) Sacral tenderness ART Improved   M99.05 (Pelvis) Pubic symphysis pain ME Improved   M99.06 (LE) Piriformis hypertonicity right ME Improved   M99.07 (UE)      M99.08 (RIB) Elevated first rib right ART Improved   M99.09 (abd/  Other)            ASSESSMENT/PLAN     1. SI (sacroiliac) pain  2. Somatic dysfunction of head region  3. Somatic dysfunction of thoracic region  4. Somatic dysfunction of sacral region  5. Somatic dysfunction of pelvis region  6. Somatic dysfunction of lower extremity  7. Somatic dysfunction of rib cage region       Risk and benefits of OMT were discussed and patient was agreeable to treatment.  Treatment details noted above.  Patient tolerated OMT well without complications. Aftercare instructions and precautions were given. Follow-up OMT as needed.    Patient was discussed with Dr. Blair Yin at the time of the encounter.    Montana Avina DO PGY-2   PATIENT INSTRUCTIONS     Patient Instructions   OMT Patient Instructions:  -Drink plenty of water next 24 to 48 hours.  -Avoid any heavy activity over the next 24-48 hours, but you may resume your activities as tolerated.  Be sure to work on your home exercises and stretching the affected area(s) several times per day.  -Sometimes patients do feel sore after OMT.  They may also experience mild flu like symptoms; drinking plenty of water and/or taking Tylenol/NSAIDs as needed for any pain or discomfort can help.   -It may take more than one OMT appointment to feel better.  Therefore, we may need to see you back for an additional treatment(s).      Thank you for coming in today, it was a pleasure

## 2025-08-07 ENCOUNTER — ROUTINE PRENATAL (OUTPATIENT)
Dept: OBGYN CLINIC | Age: 40
End: 2025-08-07

## 2025-08-07 ENCOUNTER — HOSPITAL ENCOUNTER (OUTPATIENT)
Age: 40
Setting detail: SPECIMEN
Discharge: HOME | End: 2025-08-07

## 2025-08-07 VITALS
HEART RATE: 99 BPM | RESPIRATION RATE: 12 BRPM | DIASTOLIC BLOOD PRESSURE: 74 MMHG | WEIGHT: 248.2 LBS | BODY MASS INDEX: 42.6 KG/M2 | SYSTOLIC BLOOD PRESSURE: 108 MMHG

## 2025-08-07 DIAGNOSIS — O26.899 RH NEGATIVE STATE IN ANTEPARTUM PERIOD: ICD-10-CM

## 2025-08-07 DIAGNOSIS — Z67.91 RH NEGATIVE STATE IN ANTEPARTUM PERIOD: ICD-10-CM

## 2025-08-07 DIAGNOSIS — Z87.59 HISTORY OF DELIVERY OF MACROSOMAL INFANT: ICD-10-CM

## 2025-08-07 DIAGNOSIS — Z67.91 RH NEGATIVE STATE IN ANTEPARTUM PERIOD: Primary | ICD-10-CM

## 2025-08-07 DIAGNOSIS — O32.0XX0 UNSTABLE FETAL LIE, SINGLE OR UNSPECIFIED FETUS: ICD-10-CM

## 2025-08-07 DIAGNOSIS — Z86.59 HISTORY OF POSTTRAUMATIC STRESS DISORDER (PTSD): ICD-10-CM

## 2025-08-07 DIAGNOSIS — O09.529 HIGH-RISK PREGNANCY, MULTIGRAVIDA OF ADVANCED MATERNAL AGE, ANTEPARTUM: ICD-10-CM

## 2025-08-07 DIAGNOSIS — O26.899 RH NEGATIVE STATE IN ANTEPARTUM PERIOD: Primary | ICD-10-CM

## 2025-08-07 LAB — BLOOD GROUP ANTIBODIES SERPL: NEGATIVE

## 2025-08-08 ENCOUNTER — PROCEDURE VISIT (OUTPATIENT)
Age: 40
End: 2025-08-08

## 2025-08-08 VITALS
TEMPERATURE: 97.9 F | WEIGHT: 251.2 LBS | RESPIRATION RATE: 12 BRPM | SYSTOLIC BLOOD PRESSURE: 118 MMHG | DIASTOLIC BLOOD PRESSURE: 68 MMHG | HEART RATE: 92 BPM | BODY MASS INDEX: 43.12 KG/M2

## 2025-08-08 DIAGNOSIS — M99.05 SOMATIC DYSFUNCTION OF PELVIS REGION: ICD-10-CM

## 2025-08-08 DIAGNOSIS — M99.01 SOMATIC DYSFUNCTION OF CERVICAL REGION: ICD-10-CM

## 2025-08-08 DIAGNOSIS — M54.2 NECK PAIN: ICD-10-CM

## 2025-08-08 DIAGNOSIS — M99.08 SOMATIC DYSFUNCTION OF RIB CAGE REGION: ICD-10-CM

## 2025-08-08 DIAGNOSIS — M99.00 SOMATIC DYSFUNCTION OF HEAD REGION: ICD-10-CM

## 2025-08-08 DIAGNOSIS — J06.9 VIRAL URI: ICD-10-CM

## 2025-08-08 DIAGNOSIS — M53.3 SI (SACROILIAC) PAIN: Primary | ICD-10-CM

## 2025-08-08 DIAGNOSIS — M99.03 SOMATIC DYSFUNCTION OF LUMBAR REGION: ICD-10-CM

## 2025-08-08 DIAGNOSIS — M99.02 SOMATIC DYSFUNCTION OF THORACIC REGION: ICD-10-CM

## 2025-08-08 DIAGNOSIS — M99.07 SOMATIC DYSFUNCTION OF UPPER EXTREMITY: ICD-10-CM

## 2025-08-14 ENCOUNTER — PATIENT MESSAGE (OUTPATIENT)
Age: 40
End: 2025-08-14

## 2025-08-14 DIAGNOSIS — M53.3 SI (SACROILIAC) PAIN: Primary | ICD-10-CM

## 2025-08-28 ENCOUNTER — ROUTINE PRENATAL (OUTPATIENT)
Dept: OBGYN CLINIC | Age: 40
End: 2025-08-28

## 2025-08-28 VITALS
DIASTOLIC BLOOD PRESSURE: 73 MMHG | SYSTOLIC BLOOD PRESSURE: 104 MMHG | WEIGHT: 245.4 LBS | BODY MASS INDEX: 42.12 KG/M2 | HEART RATE: 99 BPM | RESPIRATION RATE: 18 BRPM

## 2025-08-28 DIAGNOSIS — Z3A.33 33 WEEKS GESTATION OF PREGNANCY: Primary | ICD-10-CM

## 2025-08-28 DIAGNOSIS — Z86.59 HISTORY OF POSTTRAUMATIC STRESS DISORDER (PTSD): ICD-10-CM

## 2025-08-28 DIAGNOSIS — O32.0XX0 UNSTABLE FETAL LIE, SINGLE OR UNSPECIFIED FETUS: ICD-10-CM

## 2025-08-28 DIAGNOSIS — Z67.91 RH NEGATIVE STATE IN ANTEPARTUM PERIOD: ICD-10-CM

## 2025-08-28 DIAGNOSIS — O26.899 RH NEGATIVE STATE IN ANTEPARTUM PERIOD: ICD-10-CM

## 2025-08-28 DIAGNOSIS — Z87.59 HISTORY OF DELIVERY OF MACROSOMAL INFANT: ICD-10-CM

## 2025-08-28 DIAGNOSIS — O09.529 HIGH-RISK PREGNANCY, MULTIGRAVIDA OF ADVANCED MATERNAL AGE, ANTEPARTUM: ICD-10-CM

## 2025-08-28 PROBLEM — T78.1XXA ALLERGIC REACTION TO FOOD: Status: RESOLVED | Noted: 2024-02-06 | Resolved: 2025-08-28

## 2025-08-28 PROCEDURE — 0502F SUBSEQUENT PRENATAL CARE: CPT | Performed by: ADVANCED PRACTICE MIDWIFE

## 2025-09-03 ENCOUNTER — ROUTINE PRENATAL (OUTPATIENT)
Dept: OBGYN CLINIC | Age: 40
End: 2025-09-03
Payer: COMMERCIAL

## 2025-09-03 VITALS
HEART RATE: 96 BPM | WEIGHT: 245.1 LBS | BODY MASS INDEX: 42.07 KG/M2 | SYSTOLIC BLOOD PRESSURE: 107 MMHG | RESPIRATION RATE: 12 BRPM | DIASTOLIC BLOOD PRESSURE: 73 MMHG

## 2025-09-03 DIAGNOSIS — O09.529 HIGH-RISK PREGNANCY, MULTIGRAVIDA OF ADVANCED MATERNAL AGE, ANTEPARTUM: Primary | ICD-10-CM

## 2025-09-03 DIAGNOSIS — Z67.91 RH NEGATIVE STATE IN ANTEPARTUM PERIOD: ICD-10-CM

## 2025-09-03 DIAGNOSIS — O26.899 RH NEGATIVE STATE IN ANTEPARTUM PERIOD: ICD-10-CM

## 2025-09-03 PROCEDURE — 59025 FETAL NON-STRESS TEST: CPT

## 2025-09-03 PROCEDURE — 0502F SUBSEQUENT PRENATAL CARE: CPT
